# Patient Record
Sex: MALE | Race: WHITE | NOT HISPANIC OR LATINO | Employment: OTHER | URBAN - METROPOLITAN AREA
[De-identification: names, ages, dates, MRNs, and addresses within clinical notes are randomized per-mention and may not be internally consistent; named-entity substitution may affect disease eponyms.]

---

## 2020-06-05 ENCOUNTER — HOSPITAL ENCOUNTER (EMERGENCY)
Facility: HOSPITAL | Age: 30
Discharge: HOME | End: 2020-06-05
Attending: EMERGENCY MEDICINE | Admitting: EMERGENCY MEDICINE
Payer: COMMERCIAL

## 2020-06-05 VITALS
HEART RATE: 121 BPM | TEMPERATURE: 98.9 F | BODY MASS INDEX: 25.05 KG/M2 | DIASTOLIC BLOOD PRESSURE: 74 MMHG | SYSTOLIC BLOOD PRESSURE: 179 MMHG | RESPIRATION RATE: 20 BRPM | WEIGHT: 175 LBS | OXYGEN SATURATION: 99 % | HEIGHT: 70 IN

## 2020-06-05 DIAGNOSIS — L02.91 ABSCESS: ICD-10-CM

## 2020-06-05 PROCEDURE — 99283 EMERGENCY DEPT VISIT LOW MDM: CPT

## 2020-06-05 PROCEDURE — 93005 ELECTROCARDIOGRAM TRACING: CPT

## 2020-06-05 PROCEDURE — 99284 EMERGENCY DEPT VISIT MOD MDM: CPT | Performed by: EMERGENCY MEDICINE

## 2020-06-05 PROCEDURE — 10060 I&D ABSCESS SIMPLE/SINGLE: CPT | Performed by: EMERGENCY MEDICINE

## 2020-06-05 RX ORDER — SULFAMETHOXAZOLE AND TRIMETHOPRIM 800; 160 MG/1; MG/1
1 TABLET ORAL 2 TIMES DAILY
Qty: 14 TABLET | Refills: 0 | Status: SHIPPED | OUTPATIENT
Start: 2020-06-05 | End: 2020-06-12

## 2020-06-05 RX ORDER — LIDOCAINE HYDROCHLORIDE 10 MG/ML
5 INJECTION, SOLUTION EPIDURAL; INFILTRATION; INTRACAUDAL; PERINEURAL ONCE
Status: COMPLETED | OUTPATIENT
Start: 2020-06-05 | End: 2020-06-05

## 2020-06-05 RX ORDER — SULFAMETHOXAZOLE AND TRIMETHOPRIM 800; 160 MG/1; MG/1
1 TABLET ORAL ONCE
Status: COMPLETED | OUTPATIENT
Start: 2020-06-05 | End: 2020-06-05

## 2020-06-05 RX ADMIN — SULFAMETHOXAZOLE AND TRIMETHOPRIM 1 TABLET: 800; 160 TABLET ORAL at 21:27

## 2020-06-05 RX ADMIN — LIDOCAINE HYDROCHLORIDE 5 ML: 10 INJECTION, SOLUTION EPIDURAL; INFILTRATION; INTRACAUDAL; PERINEURAL at 20:52

## 2020-06-06 LAB
ATRIAL RATE: 94 BPM
P AXIS: 44 DEGREES
PR INTERVAL: 224 MS
QRS AXIS: 134 DEGREES
QRSD INTERVAL: 98 MS
QT INTERVAL: 322 MS
QTC INTERVAL: 402 MS
T WAVE AXIS: -27 DEGREES
VENTRICULAR RATE: 94 BPM

## 2020-06-06 PROCEDURE — 93010 ELECTROCARDIOGRAM REPORT: CPT | Performed by: INTERNAL MEDICINE

## 2020-10-30 ENCOUNTER — HOSPITAL ENCOUNTER (EMERGENCY)
Facility: HOSPITAL | Age: 30
Discharge: HOME | End: 2020-10-30
Attending: EMERGENCY MEDICINE | Admitting: EMERGENCY MEDICINE
Payer: COMMERCIAL

## 2020-10-30 ENCOUNTER — APPOINTMENT (EMERGENCY)
Dept: CT IMAGING | Facility: HOSPITAL | Age: 30
End: 2020-10-30
Payer: COMMERCIAL

## 2020-10-30 VITALS
SYSTOLIC BLOOD PRESSURE: 132 MMHG | BODY MASS INDEX: 23.82 KG/M2 | WEIGHT: 166.01 LBS | DIASTOLIC BLOOD PRESSURE: 75 MMHG | OXYGEN SATURATION: 98 % | TEMPERATURE: 98.2 F | RESPIRATION RATE: 16 BRPM | HEART RATE: 88 BPM

## 2020-10-30 VITALS
HEART RATE: 62 BPM | BODY MASS INDEX: 23.82 KG/M2 | SYSTOLIC BLOOD PRESSURE: 127 MMHG | TEMPERATURE: 99 F | OXYGEN SATURATION: 94 % | DIASTOLIC BLOOD PRESSURE: 60 MMHG | WEIGHT: 166.01 LBS | RESPIRATION RATE: 18 BRPM

## 2020-10-30 DIAGNOSIS — K29.70 GASTRITIS: Primary | ICD-10-CM

## 2020-10-30 DIAGNOSIS — K29.00 ACUTE GASTRITIS WITHOUT HEMORRHAGE, UNSPECIFIED GASTRITIS TYPE: ICD-10-CM

## 2020-10-30 DIAGNOSIS — K26.9 DUODENAL ULCER: Primary | ICD-10-CM

## 2020-10-30 DIAGNOSIS — Z87.19 HISTORY OF DUODENAL ULCER: ICD-10-CM

## 2020-10-30 DIAGNOSIS — R10.13 ABDOMINAL PAIN, ACUTE, EPIGASTRIC: ICD-10-CM

## 2020-10-30 LAB
ALBUMIN SERPL BCP-MCNC: 4.8 G/DL (ref 3.5–5)
ALP SERPL-CCNC: 67 U/L (ref 46–116)
ALT SERPL W P-5'-P-CCNC: 39 U/L (ref 12–78)
ANION GAP SERPL CALCULATED.3IONS-SCNC: 14 MMOL/L (ref 4–13)
AST SERPL W P-5'-P-CCNC: 18 U/L (ref 5–45)
BACTERIA UR QL AUTO: NORMAL /HPF
BASOPHILS # BLD AUTO: 0.09 THOUSANDS/ΜL (ref 0–0.1)
BASOPHILS NFR BLD AUTO: 1 % (ref 0–1)
BILIRUB SERPL-MCNC: 0.82 MG/DL (ref 0.2–1)
BILIRUB UR QL STRIP: NEGATIVE
BUN SERPL-MCNC: 22 MG/DL (ref 5–25)
CALCIUM SERPL-MCNC: 9.9 MG/DL (ref 8.3–10.1)
CHLORIDE SERPL-SCNC: 102 MMOL/L (ref 100–108)
CLARITY UR: CLEAR
CO2 SERPL-SCNC: 23 MMOL/L (ref 21–32)
COLOR UR: YELLOW
CREAT SERPL-MCNC: 1.15 MG/DL (ref 0.6–1.3)
EOSINOPHIL # BLD AUTO: 0 THOUSAND/ΜL (ref 0–0.61)
EOSINOPHIL NFR BLD AUTO: 0 % (ref 0–6)
ERYTHROCYTE [DISTWIDTH] IN BLOOD BY AUTOMATED COUNT: 12.9 % (ref 11.6–15.1)
GFR SERPL CREATININE-BSD FRML MDRD: 86 ML/MIN/1.73SQ M
GLUCOSE SERPL-MCNC: 108 MG/DL (ref 65–140)
GLUCOSE UR STRIP-MCNC: NEGATIVE MG/DL
HCT VFR BLD AUTO: 45.6 % (ref 36.5–49.3)
HGB BLD-MCNC: 15.5 G/DL (ref 12–17)
HGB UR QL STRIP.AUTO: ABNORMAL
IMM GRANULOCYTES # BLD AUTO: 0.08 THOUSAND/UL (ref 0–0.2)
IMM GRANULOCYTES NFR BLD AUTO: 0 % (ref 0–2)
KETONES UR STRIP-MCNC: ABNORMAL MG/DL
LEUKOCYTE ESTERASE UR QL STRIP: NEGATIVE
LIPASE SERPL-CCNC: 52 U/L (ref 73–393)
LYMPHOCYTES # BLD AUTO: 2.2 THOUSANDS/ΜL (ref 0.6–4.47)
LYMPHOCYTES NFR BLD AUTO: 11 % (ref 14–44)
MCH RBC QN AUTO: 28.2 PG (ref 26.8–34.3)
MCHC RBC AUTO-ENTMCNC: 34 G/DL (ref 31.4–37.4)
MCV RBC AUTO: 83 FL (ref 82–98)
MONOCYTES # BLD AUTO: 1.84 THOUSAND/ΜL (ref 0.17–1.22)
MONOCYTES NFR BLD AUTO: 10 % (ref 4–12)
NEUTROPHILS # BLD AUTO: 15.23 THOUSANDS/ΜL (ref 1.85–7.62)
NEUTS SEG NFR BLD AUTO: 78 % (ref 43–75)
NITRITE UR QL STRIP: NEGATIVE
NON-SQ EPI CELLS URNS QL MICRO: NORMAL /HPF
NRBC BLD AUTO-RTO: 0 /100 WBCS
PH UR STRIP.AUTO: 5.5 [PH] (ref 4.5–8)
PLATELET # BLD AUTO: 221 THOUSANDS/UL (ref 149–390)
PMV BLD AUTO: 11.9 FL (ref 8.9–12.7)
POTASSIUM SERPL-SCNC: 3.8 MMOL/L (ref 3.5–5.3)
PROT SERPL-MCNC: 8.5 G/DL (ref 6.4–8.2)
PROT UR STRIP-MCNC: ABNORMAL MG/DL
RBC # BLD AUTO: 5.5 MILLION/UL (ref 3.88–5.62)
RBC #/AREA URNS AUTO: NORMAL /HPF
SODIUM SERPL-SCNC: 139 MMOL/L (ref 136–145)
SP GR UR STRIP.AUTO: <=1.005 (ref 1–1.03)
UROBILINOGEN UR QL STRIP.AUTO: 0.2 E.U./DL
WBC # BLD AUTO: 19.44 THOUSAND/UL (ref 4.31–10.16)
WBC #/AREA URNS AUTO: NORMAL /HPF

## 2020-10-30 PROCEDURE — C9113 INJ PANTOPRAZOLE SODIUM, VIA: HCPCS | Performed by: EMERGENCY MEDICINE

## 2020-10-30 PROCEDURE — 96374 THER/PROPH/DIAG INJ IV PUSH: CPT

## 2020-10-30 PROCEDURE — 74177 CT ABD & PELVIS W/CONTRAST: CPT

## 2020-10-30 PROCEDURE — 85025 COMPLETE CBC W/AUTO DIFF WBC: CPT | Performed by: EMERGENCY MEDICINE

## 2020-10-30 PROCEDURE — 83690 ASSAY OF LIPASE: CPT | Performed by: EMERGENCY MEDICINE

## 2020-10-30 PROCEDURE — 81001 URINALYSIS AUTO W/SCOPE: CPT

## 2020-10-30 PROCEDURE — 99285 EMERGENCY DEPT VISIT HI MDM: CPT | Performed by: EMERGENCY MEDICINE

## 2020-10-30 PROCEDURE — 96361 HYDRATE IV INFUSION ADD-ON: CPT

## 2020-10-30 PROCEDURE — 99283 EMERGENCY DEPT VISIT LOW MDM: CPT

## 2020-10-30 PROCEDURE — 36415 COLL VENOUS BLD VENIPUNCTURE: CPT | Performed by: EMERGENCY MEDICINE

## 2020-10-30 PROCEDURE — 80053 COMPREHEN METABOLIC PANEL: CPT | Performed by: EMERGENCY MEDICINE

## 2020-10-30 PROCEDURE — 99284 EMERGENCY DEPT VISIT MOD MDM: CPT

## 2020-10-30 PROCEDURE — G1004 CDSM NDSC: HCPCS

## 2020-10-30 PROCEDURE — 96375 TX/PRO/DX INJ NEW DRUG ADDON: CPT

## 2020-10-30 PROCEDURE — 99284 EMERGENCY DEPT VISIT MOD MDM: CPT | Performed by: EMERGENCY MEDICINE

## 2020-10-30 RX ORDER — SUCRALFATE 1 G/1
1 TABLET ORAL ONCE
Status: COMPLETED | OUTPATIENT
Start: 2020-10-30 | End: 2020-10-30

## 2020-10-30 RX ORDER — ONDANSETRON 2 MG/ML
4 INJECTION INTRAMUSCULAR; INTRAVENOUS ONCE
Status: COMPLETED | OUTPATIENT
Start: 2020-10-30 | End: 2020-10-30

## 2020-10-30 RX ORDER — SUCRALFATE ORAL 1 G/10ML
1 SUSPENSION ORAL
Qty: 1200 ML | Refills: 0 | Status: SHIPPED | OUTPATIENT
Start: 2020-10-30 | End: 2021-08-30 | Stop reason: ALTCHOICE

## 2020-10-30 RX ORDER — LIDOCAINE HYDROCHLORIDE 20 MG/ML
15 SOLUTION OROPHARYNGEAL ONCE
Status: COMPLETED | OUTPATIENT
Start: 2020-10-30 | End: 2020-10-30

## 2020-10-30 RX ORDER — ONDANSETRON 4 MG/1
4 TABLET, ORALLY DISINTEGRATING ORAL EVERY 6 HOURS PRN
Qty: 12 TABLET | Refills: 0 | Status: SHIPPED | OUTPATIENT
Start: 2020-10-30 | End: 2021-03-08 | Stop reason: ALTCHOICE

## 2020-10-30 RX ORDER — PANTOPRAZOLE SODIUM 40 MG/1
40 INJECTION, POWDER, FOR SOLUTION INTRAVENOUS ONCE
Status: COMPLETED | OUTPATIENT
Start: 2020-10-30 | End: 2020-10-30

## 2020-10-30 RX ORDER — PANTOPRAZOLE SODIUM 40 MG/1
40 TABLET, DELAYED RELEASE ORAL DAILY
Qty: 30 TABLET | Refills: 0 | Status: SHIPPED | OUTPATIENT
Start: 2020-10-30 | End: 2020-11-02 | Stop reason: HOSPADM

## 2020-10-30 RX ORDER — MAGNESIUM HYDROXIDE/ALUMINUM HYDROXICE/SIMETHICONE 120; 1200; 1200 MG/30ML; MG/30ML; MG/30ML
30 SUSPENSION ORAL ONCE
Status: COMPLETED | OUTPATIENT
Start: 2020-10-30 | End: 2020-10-30

## 2020-10-30 RX ADMIN — ALUMINUM HYDROXIDE, MAGNESIUM HYDROXIDE, AND SIMETHICONE 30 ML: 200; 200; 20 SUSPENSION ORAL at 09:20

## 2020-10-30 RX ADMIN — SUCRALFATE 1 G: 1 TABLET ORAL at 09:44

## 2020-10-30 RX ADMIN — ONDANSETRON 4 MG: 2 INJECTION INTRAMUSCULAR; INTRAVENOUS at 18:52

## 2020-10-30 RX ADMIN — SODIUM CHLORIDE 1000 ML: 0.9 INJECTION, SOLUTION INTRAVENOUS at 18:52

## 2020-10-30 RX ADMIN — PANTOPRAZOLE SODIUM 40 MG: 40 INJECTION, POWDER, FOR SOLUTION INTRAVENOUS at 18:52

## 2020-10-30 RX ADMIN — IOHEXOL 100 ML: 350 INJECTION, SOLUTION INTRAVENOUS at 19:10

## 2020-10-30 RX ADMIN — LIDOCAINE HYDROCHLORIDE 15 ML: 20 SOLUTION ORAL; TOPICAL at 09:20

## 2020-11-01 ENCOUNTER — APPOINTMENT (EMERGENCY)
Dept: RADIOLOGY | Facility: HOSPITAL | Age: 30
End: 2020-11-01
Payer: COMMERCIAL

## 2020-11-01 ENCOUNTER — HOSPITAL ENCOUNTER (OUTPATIENT)
Facility: HOSPITAL | Age: 30
Setting detail: OBSERVATION
Discharge: HOME | End: 2020-11-02
Attending: EMERGENCY MEDICINE | Admitting: INTERNAL MEDICINE
Payer: COMMERCIAL

## 2020-11-01 ENCOUNTER — APPOINTMENT (EMERGENCY)
Dept: CT IMAGING | Facility: HOSPITAL | Age: 30
End: 2020-11-01
Payer: COMMERCIAL

## 2020-11-01 DIAGNOSIS — K29.00 ACUTE GASTRITIS WITHOUT HEMORRHAGE, UNSPECIFIED GASTRITIS TYPE: ICD-10-CM

## 2020-11-01 DIAGNOSIS — K26.9 DUODENAL ULCER: ICD-10-CM

## 2020-11-01 DIAGNOSIS — R10.13 EPIGASTRIC PAIN: Primary | ICD-10-CM

## 2020-11-01 DIAGNOSIS — R11.2 NAUSEA AND VOMITING: ICD-10-CM

## 2020-11-01 PROBLEM — R17 ELEVATED BILIRUBIN: Status: ACTIVE | Noted: 2020-11-01

## 2020-11-01 PROBLEM — E87.6 HYPOKALEMIA: Status: ACTIVE | Noted: 2020-11-01

## 2020-11-01 PROBLEM — D72.829 LEUKOCYTOSIS: Status: ACTIVE | Noted: 2020-11-01

## 2020-11-01 PROBLEM — Z72.0 TOBACCO USE: Status: ACTIVE | Noted: 2020-11-01

## 2020-11-01 LAB
ALBUMIN SERPL BCP-MCNC: 4.3 G/DL (ref 3.5–5)
ALP SERPL-CCNC: 65 U/L (ref 46–116)
ALT SERPL W P-5'-P-CCNC: 34 U/L (ref 12–78)
ANION GAP SERPL CALCULATED.3IONS-SCNC: 11 MMOL/L (ref 4–13)
APTT PPP: 27 SECONDS (ref 23–37)
AST SERPL W P-5'-P-CCNC: 21 U/L (ref 5–45)
ATRIAL RATE: 62 BPM
ATRIAL RATE: 64 BPM
BASOPHILS # BLD AUTO: 0.12 THOUSANDS/ΜL (ref 0–0.1)
BASOPHILS NFR BLD AUTO: 1 % (ref 0–1)
BILIRUB DIRECT SERPL-MCNC: 0.24 MG/DL (ref 0–0.2)
BILIRUB SERPL-MCNC: 1.23 MG/DL (ref 0.2–1)
BUN SERPL-MCNC: 16 MG/DL (ref 5–25)
CALCIUM SERPL-MCNC: 9.2 MG/DL (ref 8.3–10.1)
CHLORIDE SERPL-SCNC: 99 MMOL/L (ref 100–108)
CO2 SERPL-SCNC: 25 MMOL/L (ref 21–32)
CREAT SERPL-MCNC: 1.01 MG/DL (ref 0.6–1.3)
EOSINOPHIL # BLD AUTO: 0.02 THOUSAND/ΜL (ref 0–0.61)
EOSINOPHIL NFR BLD AUTO: 0 % (ref 0–6)
ERYTHROCYTE [DISTWIDTH] IN BLOOD BY AUTOMATED COUNT: 12.6 % (ref 11.6–15.1)
GFR SERPL CREATININE-BSD FRML MDRD: 100 ML/MIN/1.73SQ M
GLUCOSE SERPL-MCNC: 103 MG/DL (ref 65–140)
HCT VFR BLD AUTO: 42.6 % (ref 36.5–49.3)
HGB BLD-MCNC: 14.5 G/DL (ref 12–17)
IMM GRANULOCYTES # BLD AUTO: 0.06 THOUSAND/UL (ref 0–0.2)
IMM GRANULOCYTES NFR BLD AUTO: 0 % (ref 0–2)
INR PPP: 1.07 (ref 0.84–1.19)
LACTATE SERPL-SCNC: 1 MMOL/L (ref 0.5–2)
LIPASE SERPL-CCNC: 77 U/L (ref 73–393)
LYMPHOCYTES # BLD AUTO: 2.68 THOUSANDS/ΜL (ref 0.6–4.47)
LYMPHOCYTES NFR BLD AUTO: 17 % (ref 14–44)
MAGNESIUM SERPL-MCNC: 2.3 MG/DL (ref 1.6–2.6)
MCH RBC QN AUTO: 28.4 PG (ref 26.8–34.3)
MCHC RBC AUTO-ENTMCNC: 34 G/DL (ref 31.4–37.4)
MCV RBC AUTO: 83 FL (ref 82–98)
MONOCYTES # BLD AUTO: 1.47 THOUSAND/ΜL (ref 0.17–1.22)
MONOCYTES NFR BLD AUTO: 9 % (ref 4–12)
NEUTROPHILS # BLD AUTO: 11.49 THOUSANDS/ΜL (ref 1.85–7.62)
NEUTS SEG NFR BLD AUTO: 73 % (ref 43–75)
NRBC BLD AUTO-RTO: 0 /100 WBCS
P AXIS: 36 DEGREES
P AXIS: 54 DEGREES
PLATELET # BLD AUTO: 193 THOUSANDS/UL (ref 149–390)
PMV BLD AUTO: 12 FL (ref 8.9–12.7)
POTASSIUM SERPL-SCNC: 3.3 MMOL/L (ref 3.5–5.3)
PR INTERVAL: 168 MS
PROT SERPL-MCNC: 7.6 G/DL (ref 6.4–8.2)
PROTHROMBIN TIME: 14.1 SECONDS (ref 11.6–14.5)
QRS AXIS: 85 DEGREES
QRS AXIS: 90 DEGREES
QRSD INTERVAL: 102 MS
QRSD INTERVAL: 94 MS
QT INTERVAL: 404 MS
QT INTERVAL: 412 MS
QTC INTERVAL: 418 MS
QTC INTERVAL: 432 MS
RBC # BLD AUTO: 5.11 MILLION/UL (ref 3.88–5.62)
SARS-COV-2 RNA RESP QL NAA+PROBE: NEGATIVE
SODIUM SERPL-SCNC: 135 MMOL/L (ref 136–145)
T WAVE AXIS: 53 DEGREES
T WAVE AXIS: 61 DEGREES
VENTRICULAR RATE: 62 BPM
VENTRICULAR RATE: 69 BPM
WBC # BLD AUTO: 15.84 THOUSAND/UL (ref 4.31–10.16)

## 2020-11-01 PROCEDURE — 74177 CT ABD & PELVIS W/CONTRAST: CPT

## 2020-11-01 PROCEDURE — 96361 HYDRATE IV INFUSION ADD-ON: CPT

## 2020-11-01 PROCEDURE — 93010 ELECTROCARDIOGRAM REPORT: CPT | Performed by: INTERNAL MEDICINE

## 2020-11-01 PROCEDURE — 93005 ELECTROCARDIOGRAM TRACING: CPT

## 2020-11-01 PROCEDURE — 85610 PROTHROMBIN TIME: CPT | Performed by: EMERGENCY MEDICINE

## 2020-11-01 PROCEDURE — G1004 CDSM NDSC: HCPCS

## 2020-11-01 PROCEDURE — 99285 EMERGENCY DEPT VISIT HI MDM: CPT

## 2020-11-01 PROCEDURE — C9113 INJ PANTOPRAZOLE SODIUM, VIA: HCPCS | Performed by: EMERGENCY MEDICINE

## 2020-11-01 PROCEDURE — 99220 PR INITIAL OBSERVATION CARE/DAY 70 MINUTES: CPT | Performed by: INTERNAL MEDICINE

## 2020-11-01 PROCEDURE — 99244 OFF/OP CNSLTJ NEW/EST MOD 40: CPT | Performed by: INTERNAL MEDICINE

## 2020-11-01 PROCEDURE — 83605 ASSAY OF LACTIC ACID: CPT | Performed by: EMERGENCY MEDICINE

## 2020-11-01 PROCEDURE — 83690 ASSAY OF LIPASE: CPT | Performed by: EMERGENCY MEDICINE

## 2020-11-01 PROCEDURE — 99285 EMERGENCY DEPT VISIT HI MDM: CPT | Performed by: EMERGENCY MEDICINE

## 2020-11-01 PROCEDURE — 80053 COMPREHEN METABOLIC PANEL: CPT | Performed by: EMERGENCY MEDICINE

## 2020-11-01 PROCEDURE — 83735 ASSAY OF MAGNESIUM: CPT | Performed by: NURSE PRACTITIONER

## 2020-11-01 PROCEDURE — 87040 BLOOD CULTURE FOR BACTERIA: CPT | Performed by: EMERGENCY MEDICINE

## 2020-11-01 PROCEDURE — 87635 SARS-COV-2 COVID-19 AMP PRB: CPT | Performed by: EMERGENCY MEDICINE

## 2020-11-01 PROCEDURE — 36415 COLL VENOUS BLD VENIPUNCTURE: CPT | Performed by: EMERGENCY MEDICINE

## 2020-11-01 PROCEDURE — 96375 TX/PRO/DX INJ NEW DRUG ADDON: CPT

## 2020-11-01 PROCEDURE — 71045 X-RAY EXAM CHEST 1 VIEW: CPT

## 2020-11-01 PROCEDURE — 85025 COMPLETE CBC W/AUTO DIFF WBC: CPT | Performed by: EMERGENCY MEDICINE

## 2020-11-01 PROCEDURE — 85730 THROMBOPLASTIN TIME PARTIAL: CPT | Performed by: EMERGENCY MEDICINE

## 2020-11-01 PROCEDURE — 82248 BILIRUBIN DIRECT: CPT | Performed by: NURSE PRACTITIONER

## 2020-11-01 PROCEDURE — 96365 THER/PROPH/DIAG IV INF INIT: CPT

## 2020-11-01 RX ORDER — OXYCODONE HYDROCHLORIDE 5 MG/1
5 TABLET ORAL EVERY 4 HOURS PRN
Status: DISCONTINUED | OUTPATIENT
Start: 2020-11-01 | End: 2020-11-02 | Stop reason: HOSPADM

## 2020-11-01 RX ORDER — HYDROMORPHONE HCL/PF 1 MG/ML
0.5 SYRINGE (ML) INJECTION EVERY 4 HOURS PRN
Status: DISCONTINUED | OUTPATIENT
Start: 2020-11-01 | End: 2020-11-02 | Stop reason: HOSPADM

## 2020-11-01 RX ORDER — OXYCODONE HYDROCHLORIDE 10 MG/1
10 TABLET ORAL EVERY 4 HOURS PRN
Status: DISCONTINUED | OUTPATIENT
Start: 2020-11-01 | End: 2020-11-02 | Stop reason: HOSPADM

## 2020-11-01 RX ORDER — MAGNESIUM HYDROXIDE/ALUMINUM HYDROXICE/SIMETHICONE 120; 1200; 1200 MG/30ML; MG/30ML; MG/30ML
30 SUSPENSION ORAL EVERY 6 HOURS PRN
Status: DISCONTINUED | OUTPATIENT
Start: 2020-11-01 | End: 2020-11-02 | Stop reason: HOSPADM

## 2020-11-01 RX ORDER — PANTOPRAZOLE SODIUM 40 MG/1
40 INJECTION, POWDER, FOR SOLUTION INTRAVENOUS ONCE
Status: COMPLETED | OUTPATIENT
Start: 2020-11-01 | End: 2020-11-01

## 2020-11-01 RX ORDER — ACETAMINOPHEN 325 MG/1
650 TABLET ORAL EVERY 6 HOURS PRN
Status: DISCONTINUED | OUTPATIENT
Start: 2020-11-01 | End: 2020-11-02 | Stop reason: HOSPADM

## 2020-11-01 RX ORDER — SODIUM CHLORIDE 9 MG/ML
125 INJECTION, SOLUTION INTRAVENOUS CONTINUOUS
Status: DISCONTINUED | OUTPATIENT
Start: 2020-11-01 | End: 2020-11-02 | Stop reason: HOSPADM

## 2020-11-01 RX ORDER — ONDANSETRON 2 MG/ML
4 INJECTION INTRAMUSCULAR; INTRAVENOUS EVERY 6 HOURS PRN
Status: DISCONTINUED | OUTPATIENT
Start: 2020-11-01 | End: 2020-11-02 | Stop reason: HOSPADM

## 2020-11-01 RX ORDER — ONDANSETRON 2 MG/ML
4 INJECTION INTRAMUSCULAR; INTRAVENOUS ONCE
Status: DISCONTINUED | OUTPATIENT
Start: 2020-11-01 | End: 2020-11-01

## 2020-11-01 RX ORDER — POTASSIUM CHLORIDE 14.9 MG/ML
20 INJECTION INTRAVENOUS ONCE
Status: COMPLETED | OUTPATIENT
Start: 2020-11-01 | End: 2020-11-01

## 2020-11-01 RX ORDER — HYDROMORPHONE HCL/PF 1 MG/ML
0.5 SYRINGE (ML) INJECTION ONCE
Status: COMPLETED | OUTPATIENT
Start: 2020-11-01 | End: 2020-11-01

## 2020-11-01 RX ORDER — METOCLOPRAMIDE HYDROCHLORIDE 5 MG/ML
10 INJECTION INTRAMUSCULAR; INTRAVENOUS ONCE
Status: COMPLETED | OUTPATIENT
Start: 2020-11-01 | End: 2020-11-01

## 2020-11-01 RX ORDER — PANTOPRAZOLE SODIUM 40 MG/1
40 INJECTION, POWDER, FOR SOLUTION INTRAVENOUS EVERY 12 HOURS SCHEDULED
Status: DISCONTINUED | OUTPATIENT
Start: 2020-11-02 | End: 2020-11-02 | Stop reason: HOSPADM

## 2020-11-01 RX ADMIN — SODIUM CHLORIDE 125 ML/HR: 0.9 INJECTION, SOLUTION INTRAVENOUS at 12:23

## 2020-11-01 RX ADMIN — OXYCODONE HYDROCHLORIDE 10 MG: 10 TABLET ORAL at 10:21

## 2020-11-01 RX ADMIN — ONDANSETRON 4 MG: 2 INJECTION INTRAMUSCULAR; INTRAVENOUS at 11:58

## 2020-11-01 RX ADMIN — OXYCODONE HYDROCHLORIDE 10 MG: 10 TABLET ORAL at 18:35

## 2020-11-01 RX ADMIN — PANTOPRAZOLE SODIUM 40 MG: 40 INJECTION, POWDER, FOR SOLUTION INTRAVENOUS at 02:31

## 2020-11-01 RX ADMIN — ONDANSETRON 4 MG: 2 INJECTION INTRAMUSCULAR; INTRAVENOUS at 18:19

## 2020-11-01 RX ADMIN — SODIUM CHLORIDE 125 ML/HR: 0.9 INJECTION, SOLUTION INTRAVENOUS at 04:44

## 2020-11-01 RX ADMIN — IOHEXOL 100 ML: 350 INJECTION, SOLUTION INTRAVENOUS at 04:05

## 2020-11-01 RX ADMIN — ONDANSETRON 4 MG: 2 INJECTION INTRAMUSCULAR; INTRAVENOUS at 06:04

## 2020-11-01 RX ADMIN — OXYCODONE HYDROCHLORIDE 10 MG: 10 TABLET ORAL at 06:07

## 2020-11-01 RX ADMIN — FAMOTIDINE 20 MG: 10 INJECTION, SOLUTION INTRAVENOUS at 02:32

## 2020-11-01 RX ADMIN — POTASSIUM CHLORIDE 20 MEQ: 14.9 INJECTION, SOLUTION INTRAVENOUS at 03:25

## 2020-11-01 RX ADMIN — HYDROMORPHONE HYDROCHLORIDE 0.5 MG: 1 INJECTION, SOLUTION INTRAMUSCULAR; INTRAVENOUS; SUBCUTANEOUS at 02:32

## 2020-11-01 RX ADMIN — SODIUM CHLORIDE 1000 ML: 0.9 INJECTION, SOLUTION INTRAVENOUS at 02:31

## 2020-11-01 RX ADMIN — METOCLOPRAMIDE HYDROCHLORIDE 10 MG: 5 INJECTION INTRAMUSCULAR; INTRAVENOUS at 02:32

## 2020-11-02 VITALS
BODY MASS INDEX: 24.05 KG/M2 | HEIGHT: 70 IN | WEIGHT: 167.99 LBS | HEART RATE: 50 BPM | OXYGEN SATURATION: 97 % | TEMPERATURE: 98.3 F | SYSTOLIC BLOOD PRESSURE: 118 MMHG | DIASTOLIC BLOOD PRESSURE: 71 MMHG | RESPIRATION RATE: 18 BRPM

## 2020-11-02 LAB
ANION GAP SERPL CALCULATED.3IONS-SCNC: 11 MMOL/L (ref 4–13)
BUN SERPL-MCNC: 9 MG/DL (ref 5–25)
CALCIUM SERPL-MCNC: 8.2 MG/DL (ref 8.3–10.1)
CHLORIDE SERPL-SCNC: 104 MMOL/L (ref 100–108)
CO2 SERPL-SCNC: 24 MMOL/L (ref 21–32)
CREAT SERPL-MCNC: 0.87 MG/DL (ref 0.6–1.3)
ERYTHROCYTE [DISTWIDTH] IN BLOOD BY AUTOMATED COUNT: 12.4 % (ref 11.6–15.1)
GFR SERPL CREATININE-BSD FRML MDRD: 117 ML/MIN/1.73SQ M
GLUCOSE SERPL-MCNC: 106 MG/DL (ref 65–140)
HCT VFR BLD AUTO: 36.5 % (ref 36.5–49.3)
HGB BLD-MCNC: 12 G/DL (ref 12–17)
MCH RBC QN AUTO: 27.7 PG (ref 26.8–34.3)
MCHC RBC AUTO-ENTMCNC: 32.9 G/DL (ref 31.4–37.4)
MCV RBC AUTO: 84 FL (ref 82–98)
PLATELET # BLD AUTO: 159 THOUSANDS/UL (ref 149–390)
PMV BLD AUTO: 12.4 FL (ref 8.9–12.7)
POTASSIUM SERPL-SCNC: 3.4 MMOL/L (ref 3.5–5.3)
RBC # BLD AUTO: 4.33 MILLION/UL (ref 3.88–5.62)
SODIUM SERPL-SCNC: 139 MMOL/L (ref 136–145)
WBC # BLD AUTO: 12.53 THOUSAND/UL (ref 4.31–10.16)

## 2020-11-02 PROCEDURE — 80048 BASIC METABOLIC PNL TOTAL CA: CPT | Performed by: NURSE PRACTITIONER

## 2020-11-02 PROCEDURE — 85027 COMPLETE CBC AUTOMATED: CPT | Performed by: NURSE PRACTITIONER

## 2020-11-02 PROCEDURE — 99214 OFFICE O/P EST MOD 30 MIN: CPT | Performed by: INTERNAL MEDICINE

## 2020-11-02 PROCEDURE — 86677 HELICOBACTER PYLORI ANTIBODY: CPT | Performed by: PHYSICIAN ASSISTANT

## 2020-11-02 RX ORDER — PANTOPRAZOLE SODIUM 40 MG/1
40 TABLET, DELAYED RELEASE ORAL
Qty: 60 TABLET | Refills: 5 | Status: SHIPPED | OUTPATIENT
Start: 2020-11-02 | End: 2021-08-01

## 2020-11-02 RX ORDER — POTASSIUM CHLORIDE 14.9 MG/ML
20 INJECTION INTRAVENOUS ONCE
Status: COMPLETED | OUTPATIENT
Start: 2020-11-02 | End: 2020-11-02

## 2020-11-02 RX ORDER — PANTOPRAZOLE SODIUM 40 MG/1
40 TABLET, DELAYED RELEASE ORAL 2 TIMES DAILY
Qty: 30 TABLET | Refills: 0 | Status: SHIPPED | OUTPATIENT
Start: 2020-11-02 | End: 2020-11-02 | Stop reason: HOSPADM

## 2020-11-02 RX ADMIN — OXYCODONE HYDROCHLORIDE 10 MG: 10 TABLET ORAL at 00:33

## 2020-11-02 RX ADMIN — SODIUM CHLORIDE 125 ML/HR: 0.9 INJECTION, SOLUTION INTRAVENOUS at 09:20

## 2020-11-02 RX ADMIN — ONDANSETRON 4 MG: 2 INJECTION INTRAMUSCULAR; INTRAVENOUS at 00:33

## 2020-11-02 RX ADMIN — ONDANSETRON 4 MG: 2 INJECTION INTRAMUSCULAR; INTRAVENOUS at 07:37

## 2020-11-02 RX ADMIN — POTASSIUM CHLORIDE 20 MEQ: 14.9 INJECTION, SOLUTION INTRAVENOUS at 09:55

## 2020-11-02 RX ADMIN — OXYCODONE HYDROCHLORIDE 10 MG: 10 TABLET ORAL at 07:36

## 2020-11-03 LAB
H PYLORI IGG SER IA-ACNC: 0.25 INDEX VALUE (ref 0–0.79)
H PYLORI IGM SER-ACNC: <9 UNITS (ref 0–8.9)

## 2020-11-06 LAB
BACTERIA BLD CULT: NORMAL
BACTERIA BLD CULT: NORMAL

## 2020-11-12 ENCOUNTER — TELEPHONE (OUTPATIENT)
Dept: GASTROENTEROLOGY | Facility: AMBULARY SURGERY CENTER | Age: 30
End: 2020-11-12

## 2020-11-16 ENCOUNTER — TELEPHONE (OUTPATIENT)
Dept: GASTROENTEROLOGY | Facility: AMBULARY SURGERY CENTER | Age: 30
End: 2020-11-16

## 2021-01-07 ENCOUNTER — NURSE TRIAGE (OUTPATIENT)
Dept: OTHER | Facility: OTHER | Age: 31
End: 2021-01-07

## 2021-01-07 DIAGNOSIS — Z20.822 SUSPECTED COVID-19 VIRUS INFECTION: Primary | ICD-10-CM

## 2021-01-07 NOTE — TELEPHONE ENCOUNTER
Regarding: Covid- symptoms   ----- Message from Mirela Mathew sent at 1/7/2021 12:37 PM EST -----  " I am calling to see if I can get a covid test as I have had flue like symptoms for the past week and yesterday I lost my taste and smell "

## 2021-01-07 NOTE — TELEPHONE ENCOUNTER
Reason for Disposition   [1] COVID-19 infection suspected by caller or triager AND [2] mild symptoms (cough, fever, or others) AND [8] no complications or SOB    Answer Assessment - Initial Assessment Questions  1  COVID-19 DIAGNOSIS: "Who made your Coronavirus (COVID-19) diagnosis?" "Was it confirmed by a positive lab test?" If not diagnosed by a HCP, ask "Are there lots of cases (community spread) where you live?" (See public health department website, if unsure)      widespread  2  COVID-19 EXPOSURE: "Was there any known exposure to COVID before the symptoms began?" [facility] Definition of close contact: within 6 feet (2 meters) for a total of 15 minutes or more over a 24-hour period  denies  3  ONSET: "When did the COVID-19 symptoms start?"       1/5/21  4  WORST SYMPTOM: "What is your worst symptom?" (e g , cough, fever, shortness of breath, muscle aches)      Congestion, cough  5  COUGH: "Do you have a cough?" If so, ask: "How bad is the cough?"        Yes,wet cough  6  FEVER: "Do you have a fever?" If so, ask: "What is your temperature, how was it measured, and when did it start?"      Unsure, no thermometer, denies feverish feeling  7  RESPIRATORY STATUS: "Describe your breathing?" (e g , shortness of breath, wheezing, unable to speak)       normal  8  BETTER-SAME-WORSE: "Are you getting better, staying the same or getting worse compared to yesterday?"  If getting worse, ask, "In what way?"      same  9  HIGH RISK DISEASE: "Do you have any chronic medical problems?" (e g , asthma, heart or lung disease, weak immune system, etc )      denies  11   OTHER SYMPTOMS: "Do you have any other symptoms?"  (e g , chills, fatigue, headache, loss of smell or taste, muscle pain, sore throat)        Chills, sore throat, headache, loss of taste and smell    Protocols used: CORONAVIRUS (COVID-19)  DIAGNOSED OR SUSPECTED-ADULT-OH

## 2021-01-27 ENCOUNTER — TELEPHONE (OUTPATIENT)
Dept: GASTROENTEROLOGY | Facility: AMBULARY SURGERY CENTER | Age: 31
End: 2021-01-27

## 2021-01-27 NOTE — TELEPHONE ENCOUNTER
Patient came to his appointment 20 minutes late and COVID Suspected  When asked about the COVID he stated he never got the test done since he never lost his taste or smell just a sore throat  I let him know that we need to r/s appointment since he was 20 min late  Patient stated he it just a follow up no urgent issues   When I went to check the schedule patient was upset and stated he will see someone else

## 2021-02-28 ENCOUNTER — HOSPITAL ENCOUNTER (EMERGENCY)
Facility: HOSPITAL | Age: 31
Discharge: HOME | End: 2021-02-28
Attending: EMERGENCY MEDICINE
Payer: COMMERCIAL

## 2021-02-28 VITALS
HEIGHT: 70 IN | BODY MASS INDEX: 25.77 KG/M2 | SYSTOLIC BLOOD PRESSURE: 139 MMHG | TEMPERATURE: 99 F | RESPIRATION RATE: 20 BRPM | HEART RATE: 57 BPM | WEIGHT: 180 LBS | DIASTOLIC BLOOD PRESSURE: 72 MMHG | OXYGEN SATURATION: 98 %

## 2021-02-28 DIAGNOSIS — R11.2 NAUSEA AND VOMITING: ICD-10-CM

## 2021-02-28 DIAGNOSIS — R10.84 GENERALIZED ABDOMINAL PAIN: Primary | ICD-10-CM

## 2021-02-28 LAB
ALBUMIN SERPL BCP-MCNC: 4.5 G/DL (ref 3.5–5)
ALP SERPL-CCNC: 67 U/L (ref 46–116)
ALT SERPL W P-5'-P-CCNC: 31 U/L (ref 12–78)
AMPHETAMINES SERPL QL SCN: NEGATIVE
ANION GAP SERPL CALCULATED.3IONS-SCNC: 16 MMOL/L (ref 4–13)
AST SERPL W P-5'-P-CCNC: 15 U/L (ref 5–45)
BARBITURATES UR QL: NEGATIVE
BASOPHILS # BLD AUTO: 0.14 THOUSANDS/ΜL (ref 0–0.1)
BASOPHILS NFR BLD AUTO: 1 % (ref 0–1)
BENZODIAZ UR QL: NEGATIVE
BILIRUB SERPL-MCNC: 0.65 MG/DL (ref 0.2–1)
BUN SERPL-MCNC: 14 MG/DL (ref 5–25)
CALCIUM SERPL-MCNC: 9.6 MG/DL (ref 8.3–10.1)
CHLORIDE SERPL-SCNC: 107 MMOL/L (ref 100–108)
CO2 SERPL-SCNC: 20 MMOL/L (ref 21–32)
COCAINE UR QL: NEGATIVE
CREAT SERPL-MCNC: 0.92 MG/DL (ref 0.6–1.3)
EOSINOPHIL # BLD AUTO: 0 THOUSAND/ΜL (ref 0–0.61)
EOSINOPHIL NFR BLD AUTO: 0 % (ref 0–6)
ERYTHROCYTE [DISTWIDTH] IN BLOOD BY AUTOMATED COUNT: 12.7 % (ref 11.6–15.1)
GFR SERPL CREATININE-BSD FRML MDRD: 112 ML/MIN/1.73SQ M
GLUCOSE SERPL-MCNC: 141 MG/DL (ref 65–140)
HCT VFR BLD AUTO: 44.9 % (ref 36.5–49.3)
HGB BLD-MCNC: 15.1 G/DL (ref 12–17)
IMM GRANULOCYTES # BLD AUTO: 0.05 THOUSAND/UL (ref 0–0.2)
IMM GRANULOCYTES NFR BLD AUTO: 0 % (ref 0–2)
LIPASE SERPL-CCNC: 45 U/L (ref 73–393)
LYMPHOCYTES # BLD AUTO: 1.17 THOUSANDS/ΜL (ref 0.6–4.47)
LYMPHOCYTES NFR BLD AUTO: 8 % (ref 14–44)
MCH RBC QN AUTO: 28 PG (ref 26.8–34.3)
MCHC RBC AUTO-ENTMCNC: 33.6 G/DL (ref 31.4–37.4)
MCV RBC AUTO: 83 FL (ref 82–98)
METHADONE UR QL: NEGATIVE
MONOCYTES # BLD AUTO: 0.44 THOUSAND/ΜL (ref 0.17–1.22)
MONOCYTES NFR BLD AUTO: 3 % (ref 4–12)
NEUTROPHILS # BLD AUTO: 12.7 THOUSANDS/ΜL (ref 1.85–7.62)
NEUTS SEG NFR BLD AUTO: 88 % (ref 43–75)
NRBC BLD AUTO-RTO: 0 /100 WBCS
OPIATES UR QL SCN: POSITIVE
OXYCODONE+OXYMORPHONE UR QL SCN: POSITIVE
PCP UR QL: NEGATIVE
PLATELET # BLD AUTO: 201 THOUSANDS/UL (ref 149–390)
PMV BLD AUTO: 11.9 FL (ref 8.9–12.7)
POTASSIUM SERPL-SCNC: 3.9 MMOL/L (ref 3.5–5.3)
PROT SERPL-MCNC: 7.8 G/DL (ref 6.4–8.2)
RBC # BLD AUTO: 5.39 MILLION/UL (ref 3.88–5.62)
SODIUM SERPL-SCNC: 143 MMOL/L (ref 136–145)
THC UR QL: POSITIVE
WBC # BLD AUTO: 14.5 THOUSAND/UL (ref 4.31–10.16)

## 2021-02-28 PROCEDURE — 99284 EMERGENCY DEPT VISIT MOD MDM: CPT

## 2021-02-28 PROCEDURE — 83690 ASSAY OF LIPASE: CPT | Performed by: EMERGENCY MEDICINE

## 2021-02-28 PROCEDURE — 96361 HYDRATE IV INFUSION ADD-ON: CPT

## 2021-02-28 PROCEDURE — 36415 COLL VENOUS BLD VENIPUNCTURE: CPT | Performed by: EMERGENCY MEDICINE

## 2021-02-28 PROCEDURE — 96372 THER/PROPH/DIAG INJ SC/IM: CPT

## 2021-02-28 PROCEDURE — 80053 COMPREHEN METABOLIC PANEL: CPT | Performed by: EMERGENCY MEDICINE

## 2021-02-28 PROCEDURE — 96360 HYDRATION IV INFUSION INIT: CPT

## 2021-02-28 PROCEDURE — 96375 TX/PRO/DX INJ NEW DRUG ADDON: CPT

## 2021-02-28 PROCEDURE — C9113 INJ PANTOPRAZOLE SODIUM, VIA: HCPCS | Performed by: EMERGENCY MEDICINE

## 2021-02-28 PROCEDURE — 85025 COMPLETE CBC W/AUTO DIFF WBC: CPT | Performed by: EMERGENCY MEDICINE

## 2021-02-28 PROCEDURE — 96374 THER/PROPH/DIAG INJ IV PUSH: CPT

## 2021-02-28 PROCEDURE — 99285 EMERGENCY DEPT VISIT HI MDM: CPT | Performed by: EMERGENCY MEDICINE

## 2021-02-28 PROCEDURE — 80307 DRUG TEST PRSMV CHEM ANLYZR: CPT | Performed by: EMERGENCY MEDICINE

## 2021-02-28 RX ORDER — HYDROMORPHONE HCL/PF 1 MG/ML
0.5 SYRINGE (ML) INJECTION ONCE
Status: COMPLETED | OUTPATIENT
Start: 2021-02-28 | End: 2021-02-28

## 2021-02-28 RX ORDER — PANTOPRAZOLE SODIUM 40 MG/1
40 INJECTION, POWDER, FOR SOLUTION INTRAVENOUS ONCE
Status: COMPLETED | OUTPATIENT
Start: 2021-02-28 | End: 2021-02-28

## 2021-02-28 RX ORDER — HALOPERIDOL 5 MG/ML
5 INJECTION INTRAMUSCULAR ONCE
Status: COMPLETED | OUTPATIENT
Start: 2021-02-28 | End: 2021-02-28

## 2021-02-28 RX ORDER — ONDANSETRON 2 MG/ML
4 INJECTION INTRAMUSCULAR; INTRAVENOUS ONCE
Status: COMPLETED | OUTPATIENT
Start: 2021-02-28 | End: 2021-02-28

## 2021-02-28 RX ORDER — MAGNESIUM HYDROXIDE/ALUMINUM HYDROXICE/SIMETHICONE 120; 1200; 1200 MG/30ML; MG/30ML; MG/30ML
30 SUSPENSION ORAL ONCE
Status: COMPLETED | OUTPATIENT
Start: 2021-02-28 | End: 2021-02-28

## 2021-02-28 RX ADMIN — SODIUM CHLORIDE 1000 ML: 0.9 INJECTION, SOLUTION INTRAVENOUS at 19:09

## 2021-02-28 RX ADMIN — HALOPERIDOL LACTATE 5 MG: 5 INJECTION, SOLUTION INTRAMUSCULAR at 17:40

## 2021-02-28 RX ADMIN — HYDROMORPHONE HYDROCHLORIDE 0.5 MG: 1 INJECTION, SOLUTION INTRAMUSCULAR; INTRAVENOUS; SUBCUTANEOUS at 16:29

## 2021-02-28 RX ADMIN — PANTOPRAZOLE SODIUM 40 MG: 40 INJECTION, POWDER, FOR SOLUTION INTRAVENOUS at 16:28

## 2021-02-28 RX ADMIN — SODIUM CHLORIDE 1000 ML: 0.9 INJECTION, SOLUTION INTRAVENOUS at 17:39

## 2021-02-28 RX ADMIN — ALUMINA, MAGNESIA, AND SIMETHICONE ORAL SUSPENSION REGULAR STRENGTH 30 ML: 1200; 1200; 120 SUSPENSION ORAL at 17:40

## 2021-02-28 RX ADMIN — ONDANSETRON 4 MG: 2 INJECTION INTRAMUSCULAR; INTRAVENOUS at 16:29

## 2021-02-28 NOTE — ED PROVIDER NOTES
History  Chief Complaint   Patient presents with    Abdominal Pain     pt presents for abdominal pain that came back this morning, states dx'd with duodenal ulcer     Patient is a 80-year-old male with a history of duodenal ulcer who presents with abdominal pain and nausea  Patient states that he woke up with abdominal discomfort which significantly worsened throughout the morning and afternoon  He describes a generalized abdominal burning  The pain is similar to previous episodes related to his peptic ulcer disease  He states that his last EGD was about 6 months ago which showed a duodenal ulcer  He has been taking Protonix regularly  He was hospitalized in November 2020 with similar pain  He has not seen GI since that hospitalization  After review of medical records, it appears the patient was late for his January appointment and did not reschedule this appointment  Patient admits to nausea today and was unable to tolerate his medications  He denies fever, chills, diarrhea, constipation or other complaints  He denies any alcohol or drug use, including marijuana  History provided by:  Patient  Abdominal Pain  Pain location:  Generalized  Pain quality: burning    Pain radiates to:  Does not radiate  Pain severity:  Severe  Duration:  1 day  Timing:  Constant  Progression:  Worsening  Chronicity:  Recurrent  Ineffective treatments: PPI  Associated symptoms: nausea    Associated symptoms: no chest pain, no chills, no constipation, no cough, no diarrhea, no dysuria, no fever, no hematuria, no shortness of breath, no sore throat and no vomiting        Prior to Admission Medications   Prescriptions Last Dose Informant Patient Reported?  Taking?   ondansetron (ZOFRAN-ODT) 4 mg disintegrating tablet   No No   Sig: Take 1 tablet (4 mg total) by mouth every 6 (six) hours as needed for nausea or vomiting   pantoprazole (PROTONIX) 40 mg tablet 2/27/2021 at Unknown time  No Yes   Sig: Take 1 tablet (40 mg total) by mouth 2 (two) times a day before meals   sucralfate (CARAFATE) 1 g/10 mL suspension   No No   Sig: Take 10 mL (1 g total) by mouth 4 (four) times a day (with meals and at bedtime)      Facility-Administered Medications: None       Past Medical History:   Diagnosis Date    Duodenal ulcer        Past Surgical History:   Procedure Laterality Date    EGD         History reviewed  No pertinent family history  I have reviewed and agree with the history as documented  E-Cigarette/Vaping    E-Cigarette Use Never User      E-Cigarette/Vaping Substances     Social History     Tobacco Use    Smoking status: Former Smoker     Types: Cigarettes    Smokeless tobacco: Never Used   Substance Use Topics    Alcohol use: Not Currently    Drug use: Not Currently       Review of Systems   Constitutional: Negative for chills, diaphoresis and fever  HENT: Negative for nosebleeds, sore throat and trouble swallowing  Eyes: Negative for photophobia, pain and visual disturbance  Respiratory: Negative for cough, chest tightness and shortness of breath  Cardiovascular: Negative for chest pain, palpitations and leg swelling  Gastrointestinal: Positive for abdominal pain and nausea  Negative for constipation, diarrhea and vomiting  Endocrine: Negative for polydipsia and polyuria  Genitourinary: Negative for difficulty urinating, dysuria and hematuria  Musculoskeletal: Negative for back pain, neck pain and neck stiffness  Skin: Negative for pallor and rash  Neurological: Negative for dizziness, syncope, light-headedness and headaches  All other systems reviewed and are negative  Physical Exam  Physical Exam  Vitals signs and nursing note reviewed  Constitutional:       General: He is not in acute distress  Appearance: He is well-developed  HENT:      Head: Normocephalic and atraumatic  Eyes:      Pupils: Pupils are equal, round, and reactive to light     Neck:      Musculoskeletal: Normal range of motion and neck supple  Cardiovascular:      Rate and Rhythm: Normal rate and regular rhythm  Pulses: Normal pulses  Heart sounds: Normal heart sounds  Pulmonary:      Effort: Pulmonary effort is normal  No respiratory distress  Breath sounds: Normal breath sounds  Abdominal:      General: Bowel sounds are normal  There is no distension  Palpations: Abdomen is soft  Abdomen is not rigid  Tenderness: There is generalized abdominal tenderness  There is no guarding or rebound  Musculoskeletal: Normal range of motion  General: No tenderness  Lymphadenopathy:      Cervical: No cervical adenopathy  Skin:     General: Skin is warm and dry  Capillary Refill: Capillary refill takes less than 2 seconds  Neurological:      Mental Status: He is alert and oriented to person, place, and time  Cranial Nerves: No cranial nerve deficit  Sensory: No sensory deficit           Vital Signs  ED Triage Vitals   Temperature Pulse Respirations Blood Pressure SpO2   02/28/21 1553 02/28/21 1550 02/28/21 1550 02/28/21 1554 02/28/21 1550   99 °F (37 2 °C) 61 18 130/59 100 %      Temp Source Heart Rate Source Patient Position - Orthostatic VS BP Location FiO2 (%)   02/28/21 1553 02/28/21 1550 02/28/21 1910 02/28/21 1910 --   Oral Monitor Lying Left arm       Pain Score       02/28/21 1629       Worst Possible Pain           Vitals:    02/28/21 1550 02/28/21 1554 02/28/21 1910   BP:  130/59 139/72   Pulse: 61  57   Patient Position - Orthostatic VS:   Lying         Visual Acuity      ED Medications  Medications   pantoprazole (PROTONIX) injection 40 mg (40 mg Intravenous Given 2/28/21 1628)   ondansetron (ZOFRAN) injection 4 mg (4 mg Intravenous Given 2/28/21 1629)   HYDROmorphone (DILAUDID) injection 0 5 mg (0 5 mg Intravenous Given 2/28/21 1629)   sodium chloride 0 9 % bolus 1,000 mL (0 mL Intravenous Stopped 2/28/21 1843)   haloperidol lactate (HALDOL) injection 5 mg (5 mg Intramuscular Given 2/28/21 1740)   aluminum-magnesium hydroxide-simethicone (MYLANTA) oral suspension 30 mL (30 mL Oral Given 2/28/21 1740)   sodium chloride 0 9 % bolus 1,000 mL (0 mL Intravenous Stopped 2/28/21 2016)       Diagnostic Studies  Results Reviewed     Procedure Component Value Units Date/Time    Rapid drug screen, urine [[id number]]  (Abnormal) Collected: 02/28/21 2000    Lab Status: Final result Specimen: Urine, Clean Catch Updated: 02/28/21 2026     Amph/Meth UR Negative     Barbiturate Ur Negative     Benzodiazepine Urine Negative     Cocaine Urine Negative     Methadone Urine Negative     Opiate Urine Positive     PCP Ur Negative     THC Urine Positive     Oxycodone Urine Positive    Narrative:      Presumptive report  If requested, specimen will be sent to reference lab for confirmation  FOR MEDICAL PURPOSES ONLY  IF CONFIRMATION NEEDED PLEASE CONTACT THE LAB WITHIN 5 DAYS      Drug Screen Cutoff Levels:  AMPHETAMINE/METHAMPHETAMINES  1000 ng/mL  BARBITURATES     200 ng/mL  BENZODIAZEPINES     200 ng/mL  COCAINE      300 ng/mL  METHADONE      300 ng/mL  OPIATES      300 ng/mL  PHENCYCLIDINE     25 ng/mL  THC       50 ng/mL  OXYCODONE      100 ng/mL    Comprehensive metabolic panel [[id number]]  (Abnormal) Collected: 02/28/21 1628    Lab Status: Final result Specimen: Blood from Arm, Right Updated: 02/28/21 1711     Sodium 143 mmol/L      Potassium 3 9 mmol/L      Chloride 107 mmol/L      CO2 20 mmol/L      ANION GAP 16 mmol/L      BUN 14 mg/dL      Creatinine 0 92 mg/dL      Glucose 141 mg/dL      Calcium 9 6 mg/dL      AST 15 U/L      ALT 31 U/L      Alkaline Phosphatase 67 U/L      Total Protein 7 8 g/dL      Albumin 4 5 g/dL      Total Bilirubin 0 65 mg/dL      eGFR 112 ml/min/1 73sq m     Narrative:      [place] guidelines for Chronic Kidney Disease (CKD):     Stage 1 with normal or high GFR (GFR > 90 mL/min/1 73 square meters)    Stage 2 Mild CKD (GFR = 60-89 mL/min/1 73 square meters)    Stage 3A Moderate CKD (GFR = 45-59 mL/min/1 73 square meters)    Stage 3B Moderate CKD (GFR = 30-44 mL/min/1 73 square meters)    Stage 4 Severe CKD (GFR = 15-29 mL/min/1 73 square meters)    Stage 5 End Stage CKD (GFR <15 mL/min/1 73 square meters)  Note: GFR calculation is accurate only with a steady state creatinine    Lipase [[id number]]  (Abnormal) Collected: 02/28/21 1628    Lab Status: Final result Specimen: Blood from Arm, Right Updated: 02/28/21 1711     Lipase 45 u/L     CBC and differential [[id number]]  (Abnormal) Collected: 02/28/21 1628    Lab Status: Final result Specimen: Blood from Arm, Right Updated: 02/28/21 1640     WBC 14 50 Thousand/uL      RBC 5 39 Million/uL      Hemoglobin 15 1 g/dL      Hematocrit 44 9 %      MCV 83 fL      MCH 28 0 pg      MCHC 33 6 g/dL      RDW 12 7 %      MPV 11 9 fL      Platelets 747 Thousands/uL      nRBC 0 /100 WBCs      Neutrophils Relative 88 %      Immat GRANS % 0 %      Lymphocytes Relative 8 %      Monocytes Relative 3 %      Eosinophils Relative 0 %      Basophils Relative 1 %      Neutrophils Absolute 12 70 Thousands/µL      Immature Grans Absolute 0 05 Thousand/uL      Lymphocytes Absolute 1 17 Thousands/µL      Monocytes Absolute 0 44 Thousand/µL      Eosinophils Absolute 0 00 Thousand/µL      Basophils Absolute 0 14 Thousands/µL                  No orders to display              Procedures  Procedures         ED Course  ED Course as of Mar 05 2309   Albino Scale Feb 28, 2021   1607 I discussed obtaining an x-ray to rule out free air, however patient refuses any imaging  He states that multiple imaging studies in the past for similar presentations and they always "normal"  Discussed the risk of perforated peptic ulcer disease, but patient continues to decline imaging  [phone] Patient also refusing EKG  4761 Patient initially had improvement after medications but now has worsening burning in her abdomen    He continues to refuse imaging  1740 Patient receiving additional fluids and IM Haldol  Although patient denies marijuana use, possible cannabinoid hyperemesis syndrome versus cyclic vomiting syndrome verses peptic ulcer disease  1835 Patient sleeping currently  IV fluids completed  Will reassess shortly  1859 Patient states doing a little bit better  Does not wish to p o  Challenge yet  [facility] [place]  Number of Diagnoses or Management Options  Generalized abdominal pain: new and requires workup  Nausea and vomiting: new and requires workup  Diagnosis management comments: Patient presents with abdominal pain, nausea and vomiting  He has a history of peptic ulcer disease  This may be the cause of his symptoms today  However I am also concerned for possible cyclic vomiting syndrome or cannabinoid hyperemesis syndrome  Patient did not initially have significant relief pain after PPI and opioid pain medication  However he did seem to have significant improvement with IM Haldol  Patient is currently receiving IV fluids for volume depletion  Will p o  Challenge  Disposition pending completion of IV fluids and p o  Challenge  I have signed patient out to my colleague, Dr Sinan Munoz, for final disposition         Amount and/or Complexity of Data Reviewed  Clinical lab tests: ordered and reviewed  Tests in the medicine section of CPT®: ordered and reviewed  Review and summarize past medical records: yes  Discuss the patient with other providers: yes  Independent visualization of images, tracings, or specimens: yes    Risk of Complications, Morbidity, and/or Mortality  Presenting problems: high  Diagnostic procedures: moderate  Management options: high    Patient Progress  Patient progress: stable      Disposition  Final diagnoses:   Generalized abdominal pain   Nausea and vomiting     Time reflects when diagnosis was documented in both MDM as applicable and the Disposition within this note     Time User Action Codes Description Comment    2/28/2021  7:01 PM Vitaliy Fairbanks Add [R10 84] Generalized abdominal pain     2/28/2021  7:01 PM Vitaliy Fairbanks Add [R11 2] Nausea and vomiting       ED Disposition     ED Disposition Condition Date/Time Comment    Discharge Stable Sun Feb 28, 2021  9:15 PM Pérez Jules discharge to home/self care  Follow-up Information     Follow up With Specialties Details Why Contact Info Additional [facility] [facility] Schedule an appointment as soon as possible for a visit in 1 week  [facility]  [facility] [address] [facility] [place], [address] [facility] [address] [place], [place], [address] [facility]          Discharge Medication List as of 2/28/2021  9:15 PM      CONTINUE these medications which have NOT CHANGED    Details   ondansetron (ZOFRAN-ODT) 4 mg disintegrating tablet Take 1 tablet (4 mg total) by mouth every 6 (six) hours as needed for nausea or vomiting, Starting Fri 10/30/2020, Normal      pantoprazole (PROTONIX) 40 mg tablet Take 1 tablet (40 mg total) by mouth 2 (two) times a day before meals, Starting Mon 11/2/2020, Normal      sucralfate (CARAFATE) 1 g/10 mL suspension Take 10 mL (1 g total) by mouth 4 (four) times a day (with meals and at bedtime), Starting Fri 10/30/2020, Until Sun 11/29/2020, Normal           No discharge procedures on file      PDMP Review       Value Time User    PDMP Reviewed  Yes 11/1/2020  1:49 AM Angel Izquierdo MD          ED Provider  Electronically Signed by           Abilio Valenzuela DO  03/05/21 6355

## 2021-03-01 NOTE — ED CARE HANDOFF
Emergency Department Sign Out Note        Sign out and transfer of care from Dr Ruthann Stallings  See Separate Emergency Department note  The patient, Nikki Bermudez, was evaluated by the previous provider for nausea and vomiting as well as abdominal pain      Workup Completed:  CBC, CMP  ED Course / Workup Pending (followup):  UDS - PO challenge                 Labs Reviewed   CBC AND DIFFERENTIAL - Abnormal       Result Value Ref Range Status    WBC 14 50 (*) 4 31 - 10 16 Thousand/uL Final    RBC 5 39  3 88 - 5 62 Million/uL Final    Hemoglobin 15 1  12 0 - 17 0 g/dL Final    Hematocrit 44 9  36 5 - 49 3 % Final    MCV 83  82 - 98 fL Final    MCH 28 0  26 8 - 34 3 pg Final    MCHC 33 6  31 4 - 37 4 g/dL Final    RDW 12 7  11 6 - 15 1 % Final    MPV 11 9  8 9 - 12 7 fL Final    Platelets 099  846 - 390 Thousands/uL Final    nRBC 0  /100 WBCs Final    Neutrophils Relative 88 (*) 43 - 75 % Final    Immat GRANS % 0  0 - 2 % Final    Lymphocytes Relative 8 (*) 14 - 44 % Final    Monocytes Relative 3 (*) 4 - 12 % Final    Eosinophils Relative 0  0 - 6 % Final    Basophils Relative 1  0 - 1 % Final    Neutrophils Absolute 12 70 (*) 1 85 - 7 62 Thousands/µL Final    Immature Grans Absolute 0 05  0 00 - 0 20 Thousand/uL Final    Lymphocytes Absolute 1 17  0 60 - 4 47 Thousands/µL Final    Monocytes Absolute 0 44  0 17 - 1 22 Thousand/µL Final    Eosinophils Absolute 0 00  0 00 - 0 61 Thousand/µL Final    Basophils Absolute 0 14 (*) 0 00 - 0 10 Thousands/µL Final   COMPREHENSIVE METABOLIC PANEL - Abnormal    Sodium 143  136 - 145 mmol/L Final    Potassium 3 9  3 5 - 5 3 mmol/L Final    Chloride 107  100 - 108 mmol/L Final    CO2 20 (*) 21 - 32 mmol/L Final    ANION GAP 16 (*) 4 - 13 mmol/L Final    BUN 14  5 - 25 mg/dL Final    Creatinine 0 92  0 60 - 1 30 mg/dL Final    Comment: Standardized to IDMS reference method    Glucose 141 (*) 65 - 140 mg/dL Final    Comment: If the patient is fasting, the ADA then defines impaired fasting glucose as > 100 mg/dL and diabetes as > or equal to 123 mg/dL  Specimen collection should occur prior to Sulfasalazine administration due to the potential for falsely depressed results  Specimen collection should occur prior to Sulfapyridine administration due to the potential for falsely elevated results  Calcium 9 6  8 3 - 10 1 mg/dL Final    AST 15  5 - 45 U/L Final    Comment: Specimen collection should occur prior to Sulfasalazine administration due to the potential for falsely depressed results  ALT 31  12 - 78 U/L Final    Comment: Specimen collection should occur prior to Sulfasalazine administration due to the potential for falsely depressed results  Alkaline Phosphatase 67  46 - 116 U/L Final    Total Protein 7 8  6 4 - 8 2 g/dL Final    Albumin 4 5  3 5 - 5 0 g/dL Final    Total Bilirubin 0 65  0 20 - 1 00 mg/dL Final    Comment: Use of this assay is not recommended for patients undergoing treatment with eltrombopag due to the potential for falsely elevated results      eGFR 112  ml/min/1 73sq m Final    Narrative:     [place] guidelines for Chronic Kidney Disease (CKD):     Stage 1 with normal or high GFR (GFR > 90 mL/min/1 73 square meters)    Stage 2 Mild CKD (GFR = 60-89 mL/min/1 73 square meters)    Stage 3A Moderate CKD (GFR = 45-59 mL/min/1 73 square meters)    Stage 3B Moderate CKD (GFR = 30-44 mL/min/1 73 square meters)    Stage 4 Severe CKD (GFR = 15-29 mL/min/1 73 square meters)    Stage 5 End Stage CKD (GFR <15 mL/min/1 73 square meters)  Note: GFR calculation is accurate only with a steady state creatinine   LIPASE - Abnormal    Lipase 45 (*) 73 - 393 u/L Final   RAPID DRUG SCREEN, URINE - Abnormal    Amph/Meth UR Negative  Negative Final    Barbiturate Ur Negative  Negative Final    Benzodiazepine Urine Negative  Negative Final    Cocaine Urine Negative  Negative Final    Methadone Urine Negative  Negative Final    Opiate Urine Positive (*) Negative Final    PCP Ur Negative  Negative Final    THC Urine Positive (*) Negative Final    Oxycodone Urine Positive (*) Negative Final    Narrative:     Presumptive report  If requested, specimen will be sent to reference lab for confirmation  FOR MEDICAL PURPOSES ONLY  IF CONFIRMATION NEEDED PLEASE CONTACT THE LAB WITHIN 5 DAYS  Drug Screen Cutoff Levels:  AMPHETAMINE/METHAMPHETAMINES  1000 ng/mL  BARBITURATES     200 ng/mL  BENZODIAZEPINES     200 ng/mL  COCAINE      300 ng/mL  METHADONE      300 ng/mL  OPIATES      300 ng/mL  PHENCYCLIDINE     25 ng/mL  THC       50 ng/mL  OXYCODONE      100 ng/mL                         ED Course as of Feb 28 2117   Sun Feb 28, 2021 2113 Patient has not vomited since he was signed out to me at 1900  He has not cooperated very much with the PO challenge but he has had several swallows of water over the past hour plus  At this point he is appropriate for discharge  In the meantime his uds did come back positive for thc (and opiates/oxycodone)  I suspect his symptoms are part of a cannabis hyperemesis syndrome  Procedures  MDM    Disposition  Final diagnoses:   Generalized abdominal pain   Nausea and vomiting     Time reflects when diagnosis was documented in both MDM as applicable and the Disposition within this note     Time User Action Codes Description Comment    2/28/2021  7:01 PM Vitaliy Fairbanks Add [R10 84] Generalized abdominal pain     2/28/2021  7:01 PM Vitaliy Fairbanks Add [R11 2] Nausea and vomiting       ED Disposition     ED Disposition Condition Date/Time Comment    Discharge Stable [place] Feb 28, 2021  9:15 PM Pérez Jules discharge to home/self care              Follow-up Information     Follow up With Specialties Details Why Contact Info Additional [facility] [place] Schedule an appointment as soon as possible for a visit in 1 week  [address] [facility]  [phone] [facility] [place], [address], [address] [phone]        Patient's Medications   Discharge Prescriptions    No medications on file     No discharge procedures on file         ED Provider  Electronically Signed by     Lulu Turner MD  02/28/21 4678

## 2021-03-03 ENCOUNTER — HOSPITAL ENCOUNTER (EMERGENCY)
Facility: HOSPITAL | Age: 31
Discharge: HOME | End: 2021-03-03
Attending: EMERGENCY MEDICINE
Payer: COMMERCIAL

## 2021-03-03 ENCOUNTER — TELEPHONE (OUTPATIENT)
Dept: CT IMAGING | Facility: HOSPITAL | Age: 31
End: 2021-03-03

## 2021-03-03 ENCOUNTER — APPOINTMENT (EMERGENCY)
Dept: CT IMAGING | Facility: HOSPITAL | Age: 31
End: 2021-03-03
Payer: COMMERCIAL

## 2021-03-03 VITALS
HEART RATE: 55 BPM | DIASTOLIC BLOOD PRESSURE: 58 MMHG | RESPIRATION RATE: 16 BRPM | TEMPERATURE: 98.5 F | SYSTOLIC BLOOD PRESSURE: 126 MMHG | OXYGEN SATURATION: 98 % | WEIGHT: 180 LBS | BODY MASS INDEX: 25.83 KG/M2

## 2021-03-03 DIAGNOSIS — E87.6 HYPOKALEMIA: ICD-10-CM

## 2021-03-03 DIAGNOSIS — Z87.19 HISTORY OF DUODENAL ULCER: ICD-10-CM

## 2021-03-03 DIAGNOSIS — R10.13 EPIGASTRIC PAIN: Primary | ICD-10-CM

## 2021-03-03 DIAGNOSIS — R11.2 NAUSEA & VOMITING: ICD-10-CM

## 2021-03-03 LAB
ALBUMIN SERPL BCP-MCNC: 4.4 G/DL (ref 3.5–5)
ALP SERPL-CCNC: 61 U/L (ref 46–116)
ALT SERPL W P-5'-P-CCNC: 31 U/L (ref 12–78)
ANION GAP SERPL CALCULATED.3IONS-SCNC: 12 MMOL/L (ref 4–13)
APTT PPP: 28 SECONDS (ref 23–37)
AST SERPL W P-5'-P-CCNC: 15 U/L (ref 5–45)
ATRIAL RATE: 54 BPM
ATRIAL RATE: 55 BPM
BASOPHILS # BLD AUTO: 0.15 THOUSANDS/ΜL (ref 0–0.1)
BASOPHILS NFR BLD AUTO: 1 % (ref 0–1)
BILIRUB SERPL-MCNC: 1.06 MG/DL (ref 0.2–1)
BUN SERPL-MCNC: 13 MG/DL (ref 5–25)
CALCIUM SERPL-MCNC: 8.7 MG/DL (ref 8.3–10.1)
CHLORIDE SERPL-SCNC: 101 MMOL/L (ref 100–108)
CO2 SERPL-SCNC: 26 MMOL/L (ref 21–32)
CREAT SERPL-MCNC: 1.05 MG/DL (ref 0.6–1.3)
EOSINOPHIL # BLD AUTO: 0.02 THOUSAND/ΜL (ref 0–0.61)
EOSINOPHIL NFR BLD AUTO: 0 % (ref 0–6)
ERYTHROCYTE [DISTWIDTH] IN BLOOD BY AUTOMATED COUNT: 12.6 % (ref 11.6–15.1)
GFR SERPL CREATININE-BSD FRML MDRD: 95 ML/MIN/1.73SQ M
GLUCOSE SERPL-MCNC: 115 MG/DL (ref 65–140)
HCT VFR BLD AUTO: 42.9 % (ref 36.5–49.3)
HGB BLD-MCNC: 14.5 G/DL (ref 12–17)
HOLD SPECIMEN: NORMAL
IMM GRANULOCYTES # BLD AUTO: 0.05 THOUSAND/UL (ref 0–0.2)
IMM GRANULOCYTES NFR BLD AUTO: 0 % (ref 0–2)
INR PPP: 1.16 (ref 0.84–1.19)
LACTATE SERPL-SCNC: 1.1 MMOL/L (ref 0.5–2)
LIPASE SERPL-CCNC: 63 U/L (ref 73–393)
LYMPHOCYTES # BLD AUTO: 3.11 THOUSANDS/ΜL (ref 0.6–4.47)
LYMPHOCYTES NFR BLD AUTO: 22 % (ref 14–44)
MCH RBC QN AUTO: 28.1 PG (ref 26.8–34.3)
MCHC RBC AUTO-ENTMCNC: 33.8 G/DL (ref 31.4–37.4)
MCV RBC AUTO: 83 FL (ref 82–98)
MONOCYTES # BLD AUTO: 1.37 THOUSAND/ΜL (ref 0.17–1.22)
MONOCYTES NFR BLD AUTO: 10 % (ref 4–12)
NEUTROPHILS # BLD AUTO: 9.44 THOUSANDS/ΜL (ref 1.85–7.62)
NEUTS SEG NFR BLD AUTO: 67 % (ref 43–75)
NRBC BLD AUTO-RTO: 0 /100 WBCS
P AXIS: 39 DEGREES
P AXIS: 40 DEGREES
PLATELET # BLD AUTO: 196 THOUSANDS/UL (ref 149–390)
PMV BLD AUTO: 11.9 FL (ref 8.9–12.7)
POTASSIUM SERPL-SCNC: 3.3 MMOL/L (ref 3.5–5.3)
PR INTERVAL: 142 MS
PR INTERVAL: 164 MS
PROT SERPL-MCNC: 7.7 G/DL (ref 6.4–8.2)
PROTHROMBIN TIME: 15 SECONDS (ref 11.6–14.5)
QRS AXIS: 120 DEGREES
QRS AXIS: 130 DEGREES
QRSD INTERVAL: 94 MS
QRSD INTERVAL: 96 MS
QT INTERVAL: 444 MS
QT INTERVAL: 452 MS
QTC INTERVAL: 413 MS
QTC INTERVAL: 421 MS
RBC # BLD AUTO: 5.16 MILLION/UL (ref 3.88–5.62)
SODIUM SERPL-SCNC: 139 MMOL/L (ref 136–145)
T WAVE AXIS: 0 DEGREES
T WAVE AXIS: 1 DEGREES
TROPONIN I SERPL-MCNC: 0.04 NG/ML
VENTRICULAR RATE: 52 BPM
VENTRICULAR RATE: 52 BPM
WBC # BLD AUTO: 14.14 THOUSAND/UL (ref 4.31–10.16)

## 2021-03-03 PROCEDURE — 93010 ELECTROCARDIOGRAM REPORT: CPT | Performed by: INTERNAL MEDICINE

## 2021-03-03 PROCEDURE — 96374 THER/PROPH/DIAG INJ IV PUSH: CPT

## 2021-03-03 PROCEDURE — G1004 CDSM NDSC: HCPCS

## 2021-03-03 PROCEDURE — 96375 TX/PRO/DX INJ NEW DRUG ADDON: CPT

## 2021-03-03 PROCEDURE — 83690 ASSAY OF LIPASE: CPT | Performed by: EMERGENCY MEDICINE

## 2021-03-03 PROCEDURE — 85610 PROTHROMBIN TIME: CPT | Performed by: PHYSICIAN ASSISTANT

## 2021-03-03 PROCEDURE — 99284 EMERGENCY DEPT VISIT MOD MDM: CPT

## 2021-03-03 PROCEDURE — 93005 ELECTROCARDIOGRAM TRACING: CPT

## 2021-03-03 PROCEDURE — 80053 COMPREHEN METABOLIC PANEL: CPT | Performed by: EMERGENCY MEDICINE

## 2021-03-03 PROCEDURE — 96372 THER/PROPH/DIAG INJ SC/IM: CPT

## 2021-03-03 PROCEDURE — 36415 COLL VENOUS BLD VENIPUNCTURE: CPT

## 2021-03-03 PROCEDURE — 74177 CT ABD & PELVIS W/CONTRAST: CPT

## 2021-03-03 PROCEDURE — 96361 HYDRATE IV INFUSION ADD-ON: CPT

## 2021-03-03 PROCEDURE — 84484 ASSAY OF TROPONIN QUANT: CPT | Performed by: PHYSICIAN ASSISTANT

## 2021-03-03 PROCEDURE — 99285 EMERGENCY DEPT VISIT HI MDM: CPT | Performed by: PHYSICIAN ASSISTANT

## 2021-03-03 PROCEDURE — 85025 COMPLETE CBC W/AUTO DIFF WBC: CPT | Performed by: EMERGENCY MEDICINE

## 2021-03-03 PROCEDURE — 85730 THROMBOPLASTIN TIME PARTIAL: CPT | Performed by: PHYSICIAN ASSISTANT

## 2021-03-03 PROCEDURE — 83605 ASSAY OF LACTIC ACID: CPT | Performed by: PHYSICIAN ASSISTANT

## 2021-03-03 RX ORDER — FAMOTIDINE 20 MG/1
20 TABLET, FILM COATED ORAL 2 TIMES DAILY
Qty: 6 TABLET | Refills: 0 | Status: SHIPPED | OUTPATIENT
Start: 2021-03-03 | End: 2021-08-27 | Stop reason: SDUPTHER

## 2021-03-03 RX ORDER — POTASSIUM CHLORIDE 20 MEQ/1
40 TABLET, EXTENDED RELEASE ORAL ONCE
Status: COMPLETED | OUTPATIENT
Start: 2021-03-03 | End: 2021-03-03

## 2021-03-03 RX ORDER — SUCRALFATE 1 G/1
1 TABLET ORAL ONCE
Status: DISCONTINUED | OUTPATIENT
Start: 2021-03-03 | End: 2021-03-03 | Stop reason: HOSPADM

## 2021-03-03 RX ORDER — ONDANSETRON 2 MG/ML
4 INJECTION INTRAMUSCULAR; INTRAVENOUS ONCE
Status: COMPLETED | OUTPATIENT
Start: 2021-03-03 | End: 2021-03-03

## 2021-03-03 RX ORDER — SUCRALFATE 1 G/1
1 TABLET ORAL 4 TIMES DAILY
Qty: 12 TABLET | Refills: 0 | Status: SHIPPED | OUTPATIENT
Start: 2021-03-03 | End: 2021-08-30 | Stop reason: ALTCHOICE

## 2021-03-03 RX ORDER — MAGNESIUM HYDROXIDE/ALUMINUM HYDROXICE/SIMETHICONE 120; 1200; 1200 MG/30ML; MG/30ML; MG/30ML
30 SUSPENSION ORAL ONCE
Status: COMPLETED | OUTPATIENT
Start: 2021-03-03 | End: 2021-03-03

## 2021-03-03 RX ORDER — HALOPERIDOL 5 MG/ML
5 INJECTION INTRAMUSCULAR ONCE
Status: COMPLETED | OUTPATIENT
Start: 2021-03-03 | End: 2021-03-03

## 2021-03-03 RX ORDER — ONDANSETRON 4 MG/1
4 TABLET, FILM COATED ORAL EVERY 6 HOURS
Qty: 12 TABLET | Refills: 0 | Status: SHIPPED | OUTPATIENT
Start: 2021-03-03 | End: 2021-03-08 | Stop reason: SDUPTHER

## 2021-03-03 RX ADMIN — ONDANSETRON 4 MG: 2 INJECTION INTRAMUSCULAR; INTRAVENOUS at 03:04

## 2021-03-03 RX ADMIN — ALUMINA, MAGNESIA, AND SIMETHICONE ORAL SUSPENSION REGULAR STRENGTH 30 ML: 1200; 1200; 120 SUSPENSION ORAL at 04:15

## 2021-03-03 RX ADMIN — POTASSIUM CHLORIDE 40 MEQ: 1500 TABLET, EXTENDED RELEASE ORAL at 03:43

## 2021-03-03 RX ADMIN — SODIUM CHLORIDE 1000 ML: 0.9 INJECTION, SOLUTION INTRAVENOUS at 04:07

## 2021-03-03 RX ADMIN — HALOPERIDOL LACTATE 5 MG: 5 INJECTION, SOLUTION INTRAMUSCULAR at 05:11

## 2021-03-03 RX ADMIN — FAMOTIDINE 20 MG: 10 INJECTION, SOLUTION INTRAVENOUS at 04:07

## 2021-03-03 RX ADMIN — IOHEXOL 85 ML: 350 INJECTION, SOLUTION INTRAVENOUS at 05:48

## 2021-03-03 NOTE — DISCHARGE INSTRUCTIONS
Take Zofran, Carafate, and Pepcid as indicated  Follow-up with GI  Follow-up with PCP  Follow up emergency department symptoms persist or exacerbate

## 2021-03-03 NOTE — ED PROVIDER NOTES
History  Chief Complaint   Patient presents with    Abdominal Pain     Pt here for abdominal pain, states he was here a few days ago for stomach ulcer began throwing up tonight and since then has not felt well  Pt c/o abdominal pain and nausea  has not followed up with his GI yet  Patient is a 41-year-old male with history of duodenal ulcer no significant past surgical history that presents emergency department with epigastric burning persistent worsening nonradiating for 3 days  Patient has associated symptomatology of nausea symptoms beginning with current ED presentation of epigastric pain  Patient states that he has a history of a duodenal ulcers and was recently seen emergency department for symptoms similar to current on 02/28/2021; with positive UDS, and largely unremarkable clinical labs and discharged home care with follow-up to PCP and ED return precautions place  Patient states that he has tried Carafate in the past and with verbal refusal of Carafate at this time  Patient states that he is currently followed by GI  Patient affirms palliative factors of eating with provocative factors of pressure to epigastric area  Patient denies not effective treatment  Patient's fevers, chills, vomiting, diarrhea, constipation urinary symptoms  Patient's recent fall or recent trauma  Patient denies sick contacts or recent trauma  Patient denies chest pain and shortness of breath        History provided by:  Patient   used: No    Abdominal Pain  Pain location:  Epigastric  Pain quality: burning    Pain radiates to:  Does not radiate  Pain severity:  Mild  Onset quality:  Gradual  Duration:  3 days  Timing:  Constant  Progression:  Worsening  Chronicity:  Recurrent  Relieved by:  Eating  Worsened by:  Palpation  Ineffective treatments:  None tried  Associated symptoms: nausea    Associated symptoms: no chest pain, no chills, no constipation, no cough, no diarrhea, no dysuria, no fever, no shortness of breath, no sore throat and no vomiting        Prior to Admission Medications   Prescriptions Last Dose Informant Patient Reported? Taking?   ondansetron (ZOFRAN-ODT) 4 mg disintegrating tablet   No No   Sig: Take 1 tablet (4 mg total) by mouth every 6 (six) hours as needed for nausea or vomiting   pantoprazole (PROTONIX) 40 mg tablet   No No   Sig: Take 1 tablet (40 mg total) by mouth 2 (two) times a day before meals   sucralfate (CARAFATE) 1 g/10 mL suspension   No No   Sig: Take 10 mL (1 g total) by mouth 4 (four) times a day (with meals and at bedtime)      Facility-Administered Medications: None       Past Medical History:   Diagnosis Date    Duodenal ulcer        Past Surgical History:   Procedure Laterality Date    EGD         No family history on file  I have reviewed and agree with the history as documented  E-Cigarette/Vaping    E-Cigarette Use Never User      E-Cigarette/Vaping Substances     Social History     Tobacco Use    Smoking status: Former Smoker     Types: Cigarettes    Smokeless tobacco: Never Used   Substance Use Topics    Alcohol use: Not Currently    Drug use: Not Currently       Review of Systems   Constitutional: Negative for activity change, appetite change, chills and fever  HENT: Negative for congestion, postnasal drip, rhinorrhea, sinus pressure, sinus pain, sore throat and tinnitus  Eyes: Negative for photophobia and visual disturbance  Respiratory: Negative for cough, chest tightness and shortness of breath  Cardiovascular: Negative for chest pain and palpitations  Gastrointestinal: Positive for abdominal pain and nausea  Negative for constipation, diarrhea and vomiting  Genitourinary: Negative for difficulty urinating, dysuria, flank pain, frequency and urgency  Musculoskeletal: Negative for back pain, gait problem, neck pain and neck stiffness  Skin: Negative for pallor and rash     Allergic/Immunologic: Negative for environmental allergies and food allergies  Neurological: Negative for dizziness, weakness, numbness and headaches  Psychiatric/Behavioral: Negative for confusion  All other systems reviewed and are negative  Physical Exam  Physical Exam  Vitals signs and nursing note reviewed  Constitutional:       General: He is awake  Appearance: Normal appearance  He is well-developed  He is not ill-appearing, toxic-appearing or diaphoretic  Comments: /63 (BP Location: Right arm)   Pulse 61   Temp 98 5 °F (36 9 °C) (Oral)   Resp 16   Wt 81 6 kg (180 lb)   SpO2 96%   BMI 25 83 kg/m²      HENT:      Head: Normocephalic and atraumatic  Right Ear: Hearing and external ear normal  No decreased hearing noted  No drainage, swelling or tenderness  No mastoid tenderness  Left Ear: Hearing and external ear normal  No decreased hearing noted  No drainage, swelling or tenderness  No mastoid tenderness  Nose: Nose normal       Mouth/Throat:      Lips: Pink  Mouth: Mucous membranes are moist       Pharynx: Oropharynx is clear  Uvula midline  Eyes:      General: Lids are normal  Vision grossly intact  Right eye: No discharge  Left eye: No discharge  Extraocular Movements: Extraocular movements intact  Conjunctiva/sclera: Conjunctivae normal       Pupils: Pupils are equal, round, and reactive to light  Neck:      Musculoskeletal: Full passive range of motion without pain, normal range of motion and neck supple  Normal range of motion  No neck rigidity, spinous process tenderness or muscular tenderness  Vascular: No JVD  Trachea: Trachea and phonation normal  No tracheal tenderness or tracheal deviation  Cardiovascular:      Rate and Rhythm: Normal rate and regular rhythm  Pulses: Normal pulses  Radial pulses are 2+ on the right side and 2+ on the left side  Posterior tibial pulses are 2+ on the right side and 2+ on the left side        Heart sounds: Normal heart sounds  Pulmonary:      Effort: Pulmonary effort is normal       Breath sounds: Normal breath sounds  No stridor  No decreased breath sounds, wheezing, rhonchi or rales  Chest:      Chest wall: No tenderness  Abdominal:      General: Abdomen is flat  Bowel sounds are normal  There is no distension  Palpations: Abdomen is soft  Abdomen is not rigid  Tenderness: There is abdominal tenderness in the epigastric area  There is no guarding or rebound  Musculoskeletal: Normal range of motion  Lymphadenopathy:      Head:      Right side of head: No submental, submandibular, tonsillar, preauricular, posterior auricular or occipital adenopathy  Left side of head: No submental, submandibular, tonsillar, preauricular, posterior auricular or occipital adenopathy  Cervical: No cervical adenopathy  Right cervical: No superficial, deep or posterior cervical adenopathy  Left cervical: No superficial, deep or posterior cervical adenopathy  Skin:     General: Skin is warm  Capillary Refill: Capillary refill takes less than 2 seconds  Neurological:      General: No focal deficit present  Mental Status: He is alert and oriented to person, place, and time  GCS: GCS eye subscore is 4  GCS verbal subscore is 5  GCS motor subscore is 6  Sensory: No sensory deficit  Deep Tendon Reflexes: Reflexes are normal and symmetric  Reflex Scores:       Patellar reflexes are 2+ on the right side and 2+ on the left side  Psychiatric:         Mood and Affect: Mood normal          Speech: Speech normal          Behavior: Behavior normal  Behavior is cooperative  Thought Content:  Thought content normal          Judgment: Judgment normal          Vital Signs  ED Triage Vitals   Temperature Pulse Respirations Blood Pressure SpO2   03/03/21 0256 03/03/21 0254 03/03/21 0254 03/03/21 0254 03/03/21 0254   98 5 °F (36 9 °C) 61 16 145/63 96 %      Temp Source Heart Rate Source Patient Position - Orthostatic VS BP Location FiO2 (%)   03/03/21 0256 03/03/21 0254 03/03/21 0254 03/03/21 0254 --   Oral Monitor Sitting Right arm       Pain Score       03/03/21 0254       8           Vitals:    03/03/21 0254 03/03/21 0415 03/03/21 0515   BP: 145/63 123/58 126/58   Pulse: 61 64 55   Patient Position - Orthostatic VS: Sitting Sitting Lying         Visual Acuity      ED Medications  Medications   sucralfate (CARAFATE) tablet 1 g (1 g Oral Not Given 3/3/21 0513)   ondansetron (ZOFRAN) injection 4 mg (4 mg Intravenous Given 3/3/21 0304)   potassium chloride (K-DUR,KLOR-CON) CR tablet 40 mEq (40 mEq Oral Given 3/3/21 0343)   sodium chloride 0 9 % bolus 1,000 mL (0 mL Intravenous Stopped 3/3/21 0513)   famotidine (PEPCID) injection 20 mg (20 mg Intravenous Given 3/3/21 0407)   aluminum-magnesium hydroxide-simethicone (MYLANTA) oral suspension 30 mL (30 mL Oral Given 3/3/21 0415)   haloperidol lactate (HALDOL) injection 5 mg (5 mg Intramuscular Given 3/3/21 0511)   iohexol (OMNIPAQUE) 350 MG/ML injection (SINGLE-DOSE) 85 mL (85 mL Intravenous Given 3/3/21 0548)       Diagnostic Studies  Results Reviewed     Procedure Component Value Units Date/Time    Troponin I [[id number]]  (Normal) Collected: 03/03/21 0451    Lab Status: Final result Specimen: Blood from Arm, Right Updated: 03/03/21 0522     Troponin I 0 04 ng/mL     Lactic acid [[id number]]  (Normal) Collected: 03/03/21 0427    Lab Status: Final result Specimen: Blood Updated: 03/03/21 0450     LACTIC ACID 1 1 mmol/L     Narrative:      Result may be elevated if tourniquet was used during collection      Protime-INR [[id number]]  (Abnormal) Collected: 03/03/21 0304    Lab Status: Final result Specimen: Blood from Arm, Right Updated: 03/03/21 0416     Protime 15 0 seconds      INR 1 16    APTT [[id number]]  (Normal) Collected: 03/03/21 0304    Lab Status: Final result Specimen: Blood from Arm, Right Updated: 03/03/21 1036     PTT 28 seconds     Comprehensive metabolic panel [[id number]]  (Abnormal) Collected: 03/03/21 0304    Lab Status: Final result Specimen: Blood from Arm, Right Updated: 03/03/21 0328     Sodium 139 mmol/L      Potassium 3 3 mmol/L      Chloride 101 mmol/L      CO2 26 mmol/L      ANION GAP 12 mmol/L      BUN 13 mg/dL      Creatinine 1 05 mg/dL      Glucose 115 mg/dL      Calcium 8 7 mg/dL      AST 15 U/L      ALT 31 U/L      Alkaline Phosphatase 61 U/L      Total Protein 7 7 g/dL      Albumin 4 4 g/dL      Total Bilirubin 1 06 mg/dL      eGFR 95 ml/min/1 73sq m     Narrative:      National Kidney Disease Foundation guidelines for Chronic Kidney Disease (CKD):     Stage 1 with normal or high GFR (GFR > 90 mL/min/1 73 square meters)    Stage 2 Mild CKD (GFR = 60-89 mL/min/1 73 square meters)    Stage 3A Moderate CKD (GFR = 45-59 mL/min/1 73 square meters)    Stage 3B Moderate CKD (GFR = 30-44 mL/min/1 73 square meters)    Stage 4 Severe CKD (GFR = 15-29 mL/min/1 73 square meters)    Stage 5 End Stage CKD (GFR <15 mL/min/1 73 square meters)  Note: GFR calculation is accurate only with a steady state creatinine    Lipase [[id number]]  (Abnormal) Collected: 03/03/21 0304    Lab Status: Final result Specimen: Blood from Arm, Right Updated: 03/03/21 0328     Lipase 63 u/L     CBC and differential [[id number]]  (Abnormal) Collected: 03/03/21 0304    Lab Status: Final result Specimen: Blood from Arm, Right Updated: 03/03/21 0315     WBC 14 14 Thousand/uL      RBC 5 16 Million/uL      Hemoglobin 14 5 g/dL      Hematocrit 42 9 %      MCV 83 fL      MCH 28 1 pg      MCHC 33 8 g/dL      RDW 12 6 %      MPV 11 9 fL      Platelets 737 Thousands/uL      nRBC 0 /100 WBCs      Neutrophils Relative 67 %      Immat GRANS % 0 %      Lymphocytes Relative 22 %      Monocytes Relative 10 %      Eosinophils Relative 0 %      Basophils Relative 1 %      Neutrophils Absolute 9 44 Thousands/µL      Immature Grans Absolute 0 05 Thousand/uL Lymphocytes Absolute 3 11 Thousands/µL      Monocytes Absolute 1 37 Thousand/µL      Eosinophils Absolute 0 02 Thousand/µL      Basophils Absolute 0 15 Thousands/µL                  CT abdomen pelvis with contrast   ED Interpretation by Young Simmonds, PA-C (03/03 4053)   Mike Rios MD  [phone] 3/3/2021     Narrative & Impression    CT ABDOMEN AND PELVIS WITH IV CONTRAST     INDICATION:   Epigastric pain  EPIGASTRIC PAIN; NAUSEA      COMPARISON:  CT 11/1/2020      TECHNIQUE:  CT examination of the abdomen and pelvis was performed  Axial, sagittal, and coronal 2D reformatted images were created from the source data and submitted for interpretation      Radiation dose length product (DLP) for this visit:  352 mGy-cm   This examination, like all CT scans performed in the [facility], was performed utilizing techniques to minimize radiation dose exposure, including the use of iterative   reconstruction and automated exposure control      IV Contrast:  85 mL of iohexol (OMNIPAQUE)  Enteric Contrast:  Enteric contrast was not administered      FINDINGS:     ABDOMEN     LOWER CHEST:  No clinically significant abnormality identified in the visualized lower chest      LIVER/BILIARY TREE:  Unremarkable      GALLBLADDER:  No calcified ga   llstones  No pericholecystic inflammatory change      SPLEEN:  Unremarkable      PANCREAS:  Unremarkable      ADRENAL GLANDS:  Unremarkable      KIDNEYS/URETERS:  Unremarkable  No hydronephrosis      STOMACH AND BOWEL:  Unremarkable      APPENDIX:  No findings to suggest appendicitis      ABDOMINOPELVIC CAVITY:  No ascites  No pneumoperitoneum    No lymphadenopathy      VESSELS:  Unremarkable for patient's age      PELVIS     REPRODUCTIVE ORGANS:  Unremarkable for patient's age      URINARY BLADDER:  Unremarkable      ABDOMINAL WALL/INGUINAL REGIONS:  Unremarkable      OSSEOUS STRUCTURES:  No acute fracture or destructive osseous lesion      IMPRESSION:     No acute pathology            Workstation performed: [id number]          Final Result by Nisreen Rome MD (03/03 0728)      No acute pathology  Workstation performed: [id number]                    Procedures  ECG 12 Lead Documentation Only    Date/Time: 3/3/2021 4:14 AM  Performed by: Mckenzie Amador PA-C  Authorized by: Mckenzie Amador PA-C     Indications / Diagnosis:  Epigastric pain  ECG reviewed by me, the ED Provider: yes    Patient location:  ED  Previous ECG:     Previous ECG:  Compared to current    Similarity:  No change    Comparison to cardiac monitor: Yes    Interpretation:     Interpretation: normal    Rate:     ECG rate:  52    ECG rate assessment: bradycardic    Rhythm:     Rhythm: sinus bradycardia    Ectopy:     Ectopy: none    QRS:     QRS axis:  Right    QRS intervals:  Normal  Conduction:     Conduction: normal    ST segments:     ST segments:  Normal  T waves:     T waves: normal    Comments:      I disagree with computer interpretation of acute MI  ECG 12 Lead Documentation Only    Date/Time: 3/3/2021 4:49 AM  Performed by: Mckenzie Amador PA-C  Authorized by: Mckenzie Amador PA-C     Indications / Diagnosis:  Epigastric pain  ECG reviewed by me, the ED Provider: yes    Patient location:  ED  Previous ECG:     Previous ECG:  Compared to current    Similarity:  No change    Comparison to cardiac monitor: Yes    Interpretation:     Interpretation: normal    Rate:     ECG rate:  52    ECG rate assessment: bradycardic    Rhythm:     Rhythm: sinus rhythm    Ectopy:     Ectopy: none    QRS:     QRS axis:  Right    QRS intervals:  Normal  Conduction:     Conduction: normal    ST segments:     ST segments:  Normal  T waves:     T waves: normal               ED Course                             SBIRT 20yo+      Most Recent Value   SBIRT (24 yo +)   In order to provide better care to our patients, we are screening all of our patients for alcohol and drug use   Would it be okay to ask you these screening questions? Unable to answer at this time Filed at: 03/03/2021 0256                    [facility]  Number of Diagnoses or Management Options  Epigastric pain: new and does not require workup  History of duodenal ulcer: new and does not require workup  Hypokalemia: new and does not require workup  Nausea & vomiting: new and does not require workup     Amount and/or Complexity of Data Reviewed  Clinical lab tests: ordered and reviewed  Tests in the radiology section of CPT®: ordered and reviewed  Review and summarize past medical records: yes  Independent visualization of images, tracings, or specimens: yes    Risk of Complications, Morbidity, and/or Mortality  Presenting problems: moderate  Diagnostic procedures: moderate  Management options: low    Patient Progress  Patient progress: stable     Patient is a 81-year-old male with history of duodenal ulcer no significant past surgical history that presents emergency department with epigastric burning persistent worsening nonradiating for 3 days  Patient has associated symptomatology of nausea symptoms beginning with current ED presentation of epigastric pain     Patient hemodynamically stable and afebrile  ECG with normal sinus rhythm with negative troponin  Slight leukocytosis, no banding; no lactic acidosis  Hypokalemia with potassium 3 3 with K-Dur delivered  CT abdomen pelvis with contrast-impression-no acute pathology "  Delivered Mylanta, Pepcid, Haldol, Zofran with patient verbalized decrease in abdominal pain and nausea symptoms status post medication delivery  Patient denies urinary symptoms  Prescribed Pepcid, Zofran, Haldol, Carafate and counseled patient medication administration and side effects  Advised patient to consume small meals throughout the day and follow-up with GI further evaluation  Follow-up with GI  Follow-up with emergency department symptoms persist or exacerbate  Follow-up with PCP  Patient verbal understanding all clinical laboratory imaging findings, discharge instructions, follow-up verbalized agreement current treatment plan  Disposition  Final diagnoses:   Epigastric pain   Nausea & vomiting   Hypokalemia   History of duodenal ulcer     Time reflects when diagnosis was documented in both MDM as applicable and the Disposition within this note     Time User Action Codes Description Comment    3/3/2021  6:27 AM Yvrose Forget Add [R10 13] Epigastric pain     3/3/2021  6:27 AM Yvrose Forget Add [R11 2] Nausea & vomiting     3/3/2021  6:27 AM Yvrose Forget Add [E87 6] Hypokalemia     3/3/2021  6:27 AM Yvrose Forget Add [Z87 19] History of duodenal ulcer       ED Disposition     ED Disposition Condition Date/Time Comment    Discharge Stable Wed Mar 3, 2021  6:31 AM Teri Jules discharge to home/self care              Follow-up Information     Follow up With Specialties Details Why Contact Info Additional Information    Arpita Mcgee DO Family Medicine Call in 1 week for further evaluation of symptoms [address] [facility] [phone]       [address] Emergency Department Emergency Medicine Go to  As needed [address] Emergency Department,  [address], [place], [address]    Lena Pyle PA-C Gastroenterology, Physician Assistant Call in 2 days for further evaluation of symptoms [address] [facility] 105  [phone]             Discharge Medication List as of 3/3/2021  6:32 AM      START taking these medications    Details   famotidine (PEPCID) 20 mg tablet Take 1 tablet (20 mg total) by mouth 2 (two) times a day for 3 days, Starting Wed 3/3/2021, Until Sat 3/6/2021, Normal      ondansetron (ZOFRAN) 4 mg tablet Take 1 tablet (4 mg total) by mouth every 6 (six) hours for 3 days, Starting Wed 3/3/2021, Until Sat 3/6/2021, Normal      sucralfate (CARAFATE) 1 g tablet Take 1 tablet (1 g total) by mouth 4 (four) times a day for 3 days, Starting Wed 3/3/2021, Until Sat 3/6/2021, Normal         CONTINUE these medications which have NOT CHANGED    Details   ondansetron (ZOFRAN-ODT) 4 mg disintegrating tablet Take 1 tablet (4 mg total) by mouth every 6 (six) hours as needed for nausea or vomiting, Starting Fri 10/30/2020, Normal      pantoprazole (PROTONIX) 40 mg tablet Take 1 tablet (40 mg total) by mouth 2 (two) times a day before meals, Starting Mon 11/2/2020, Normal      sucralfate (CARAFATE) 1 g/10 mL suspension Take 10 mL (1 g total) by mouth 4 (four) times a day (with meals and at bedtime), Starting Fri 10/30/2020, Until Sun 11/29/2020, Normal           No discharge procedures on file      PDMP Review       Value Time User    PDMP Reviewed  Yes 11/1/2020  1:49 AM Darylene Knee, MD          ED Provider  Electronically Signed by           Jolene Chun PA-C  03/03/21 [id number] [facility]  03/03/21 6652

## 2021-03-03 NOTE — Clinical Note
Sanjuana Sylwiajose alfredo was seen and treated in our emergency department on 3/3/2021  Diagnosis:     Bimal Browne    He may return on this date: 03/05/2021         If you have any questions or concerns, please don't hesitate to call        Yajaira De Leon PA-C    ______________________________           _______________          _______________  Hospital Representative                              Date                                Time

## 2021-03-08 ENCOUNTER — TELEPHONE (OUTPATIENT)
Dept: GASTROENTEROLOGY | Facility: AMBULARY SURGERY CENTER | Age: 31
End: 2021-03-08

## 2021-03-08 DIAGNOSIS — R11.2 NAUSEA AND VOMITING, INTRACTABILITY OF VOMITING NOT SPECIFIED, UNSPECIFIED VOMITING TYPE: Primary | ICD-10-CM

## 2021-03-08 RX ORDER — PROMETHAZINE HYDROCHLORIDE 12.5 MG/1
12.5 TABLET ORAL EVERY 6 HOURS PRN
Qty: 30 TABLET | Refills: 0 | Status: SHIPPED | OUTPATIENT
Start: 2021-03-08 | End: 2021-08-27 | Stop reason: SDUPTHER

## 2021-03-08 NOTE — TELEPHONE ENCOUNTER
Patients GI provider:  Dr Delonte Davis     Number to return call: [phone]     Reason for call: Pt calling requesting to speak with someone regarding sleepless due to pain       Scheduled procedure/appointment date if applicable: Apt/procedure 3/17/21

## 2021-03-08 NOTE — TELEPHONE ENCOUNTER
Hx duodenal ulcer, abdominal pain, n/v    Patient called to request increasing his zofran for persistent nausea that is interrupting his sleep  Follow up visit scheduled 3/17/21  Constant nausea, intermittent burning mid abdominal pain 6-9/10   1-2 loose to formed BMs daily with mucous  Following bland diet  Taking pantoprazole and pepcid twice daily  Zofran 4 mg script from ED -used all that he was given  Carafate does not help him

## 2021-03-08 NOTE — TELEPHONE ENCOUNTER
Patient is agreeable to trial phenergan- please send script  Reviewed attached provider recommendations with patient

## 2021-03-08 NOTE — TELEPHONE ENCOUNTER
Please advise we can try a different antiemetic such as phenergan, as his zofran dose of 4 mg q 6 hrs shouldn't be increased beyond that level  Should avoid alcohol, marijuana, narcotics, NSAIDs, and continue PPI twice daily, and follow up for office visit as scheduled  I'll send Rx if he agrees    Thanks

## 2021-03-17 ENCOUNTER — OFFICE VISIT (OUTPATIENT)
Dept: GASTROENTEROLOGY | Facility: AMBULARY SURGERY CENTER | Age: 31
End: 2021-03-17
Payer: COMMERCIAL

## 2021-03-17 VITALS
DIASTOLIC BLOOD PRESSURE: 80 MMHG | BODY MASS INDEX: 24.2 KG/M2 | WEIGHT: 169 LBS | SYSTOLIC BLOOD PRESSURE: 138 MMHG | HEIGHT: 70 IN

## 2021-03-17 DIAGNOSIS — R10.10 PAIN OF UPPER ABDOMEN: Primary | ICD-10-CM

## 2021-03-17 PROCEDURE — 99214 OFFICE O/P EST MOD 30 MIN: CPT | Performed by: INTERNAL MEDICINE

## 2021-03-17 NOTE — PROGRESS NOTES
Follow-up Note -  [facility]  Sharla Morales 1990 male         Reason:  Follow-up    HPI:  Mr Manas White was seen by our service in the hospital in November when he was admitted with an episode of abdominal pain associated with nausea, vomiting  He reports having these flare ups 3 times in the past year and the recent 1 was couple of weeks ago when he was seen in the ER  The symptoms usually last for 3-4 days and then he becomes normal   He also remembers having psychological stress and arguments the night prior to the procedures  He used to have these episodes once year in the past years and was told about duodenal ulcer 5 years ago when he was given Dexilant with help  He was seen by Dr Didier Sandhu at [address] [place] [address] and had endoscopy which showed duodenal ulcers  H pylori antibody was negative  He reports having regular bowel movements and denies any blood in the stool  Denies any NSAID use  REVIEW OF SYSTEMS: Review of Systems   Constitutional: Negative for activity change, appetite change, chills, diaphoresis, fatigue, fever and unexpected weight change  HENT: Negative for ear discharge, ear pain, facial swelling, hearing loss, nosebleeds, sore throat, tinnitus and voice change  Eyes: Negative for pain, discharge, redness, itching and visual disturbance  Respiratory: Negative for apnea, cough, chest tightness, shortness of breath and wheezing  Cardiovascular: Negative for chest pain and palpitations  Gastrointestinal:        As noted in HPI   Endocrine: Negative for cold intolerance, heat intolerance and polyuria  Genitourinary: Negative for difficulty urinating, dysuria, flank pain, hematuria and urgency  Musculoskeletal: Negative for arthralgias, back pain, gait problem, joint swelling and myalgias  Skin: Negative for rash and wound     Neurological: Negative for dizziness, tremors, seizures, speech difficulty, light-headedness, numbness and headaches  Hematological: Negative for adenopathy  Does not bruise/bleed easily  Psychiatric/Behavioral: Negative for agitation, behavioral problems and confusion  The patient is not nervous/anxious  Past Medical History:   Diagnosis Date    Duodenal ulcer       Past Surgical History:   Procedure Laterality Date    EGD      UPPER GASTROINTESTINAL ENDOSCOPY       Social History     Socioeconomic History    Marital status: Single     Spouse name: Not on file    Number of children: Not on file    Years of education: Not on file    Highest education level: Not on file   Occupational History    Not on file   Social Needs    Financial resource strain: Not on file    Food insecurity     Worry: Not on file     Inability: Not on file    Transportation needs     Medical: Not on file     Non-medical: Not on file   Tobacco Use    Smoking status: Former Smoker     Types: Cigarettes    Smokeless tobacco: Never Used   Substance and Sexual Activity    Alcohol use: Not Currently    Drug use: Not Currently     Comment: Marijuana - stopped 1 year ago    Sexual activity: Not on file   Lifestyle    Physical activity     Days per week: Not on file     Minutes per session: Not on file    Stress: Not on file   Relationships    Social connections     Talks on phone: Not on file     Gets together: Not on file     Attends Restoration service: Not on file     Active member of club or organization: Not on file     Attends meetings of clubs or organizations: Not on file     Relationship status: Not on file    Intimate partner violence     Fear of current or ex partner: Not on file     Emotionally abused: Not on file     Physically abused: Not on file     Forced sexual activity: Not on file   Other Topics Concern    Not on file   Social History Narrative    Not on file     Family History   Problem Relation Age of Onset    Lung cancer Maternal Grandmother      Patient has no known allergies    Current Outpatient Medications   Medication Sig Dispense Refill    pantoprazole (PROTONIX) 40 mg tablet Take 1 tablet (40 mg total) by mouth 2 (two) times a day before meals 60 tablet 5    promethazine (PHENERGAN) 12 5 MG tablet Take 1 tablet (12 5 mg total) by mouth every 6 (six) hours as needed for nausea or vomiting 30 tablet 0    famotidine (PEPCID) 20 mg tablet Take 1 tablet (20 mg total) by mouth 2 (two) times a day for 3 days 6 tablet 0    sucralfate (CARAFATE) 1 g tablet Take 1 tablet (1 g total) by mouth 4 (four) times a day for 3 days 12 tablet 0    sucralfate (CARAFATE) 1 g/10 mL suspension Take 10 mL (1 g total) by mouth 4 (four) times a day (with meals and at bedtime) 1200 mL 0     No current facility-administered medications for this visit  Blood pressure 138/80, height 5' 10" (1 778 m), weight 76 7 kg (169 lb)  PHYSICAL EXAM: Physical Exam  Constitutional:       Appearance: He is well-developed  HENT:      Head: Normocephalic and atraumatic  Eyes:      General: No scleral icterus  Right eye: No discharge  Left eye: No discharge  Conjunctiva/sclera: Conjunctivae normal       Pupils: Pupils are equal, round, and reactive to light  Neck:      Musculoskeletal: Neck supple  Thyroid: No thyromegaly  Vascular: No JVD  Trachea: No tracheal deviation  Cardiovascular:      Rate and Rhythm: Normal rate and regular rhythm  Heart sounds: Normal heart sounds  No murmur  No friction rub  No gallop  Pulmonary:      Effort: Pulmonary effort is normal  No respiratory distress  Breath sounds: Normal breath sounds  No wheezing or rales  Chest:      Chest wall: No tenderness  Abdominal:      General: Bowel sounds are normal  There is no distension  Palpations: Abdomen is soft  There is no mass  Tenderness: There is no abdominal tenderness  There is no guarding or rebound  Hernia: No hernia is present     Lymphadenopathy:      Cervical: No cervical adenopathy  Skin:     General: Skin is warm and dry  Findings: No erythema or rash  Neurological:      Mental Status: He is alert and oriented to person, place, and time  Psychiatric:         Behavior: Behavior normal          Thought Content: Thought content normal           Lab Results   Component Value Date    WBC 14 14 (H) 03/03/2021    HGB 14 5 03/03/2021    HCT 42 9 03/03/2021    MCV 83 03/03/2021     03/03/2021     Lab Results   Component Value Date    CALCIUM 8 7 03/03/2021    K 3 3 (L) 03/03/2021    CO2 26 03/03/2021     03/03/2021    BUN 13 03/03/2021    CREATININE 1 05 03/03/2021     Lab Results   Component Value Date    ALT 31 03/03/2021    AST 15 03/03/2021    ALKPHOS 61 03/03/2021     Lab Results   Component Value Date    INR 1 16 03/03/2021    INR 1 07 11/01/2020    PROTIME 15 0 (H) 03/03/2021    PROTIME 14 1 11/01/2020       Ct Abdomen Pelvis With Contrast    Result Date: 3/3/2021  Impression: No acute pathology  Workstation performed: [id number]       ASSESSMENT & PLAN:    Pain of upper abdomen  History of duodenal ulcers in the previous upper endoscopies  He has been taking Protonix twice daily  He had an episode couple of weeks ago when he was seen in the ER and had negative CT scan/ lab workup  He has been doing well since then  It appears that he had some stress and arguments the night prior to the onset of symptoms which could be due to functional from irritable bowel syndrome  Doubt for biliary pathology  Rule out food allergies    -  Explained to patient in detail about different possible etiologies and given reassurance  - check celiac disease panel and food allergy profile    - schedule for EGD    - check right upper quadrant ultrasound    - Patient was explained about the lifestyle and dietary modifications  Advised to avoid fatty foods, chocolates, caffeine, alcohol and any other triggering foods    Avoid eating for at least 3 hours before going to bed         -Patient was explained about  the risks and benefits of the procedure  Risks including but not limited to bleeding, infection, perforation were explained in detail  Also explained about less than 100% sensitivity with the exam and other alternatives

## 2021-03-17 NOTE — ASSESSMENT & PLAN NOTE
History of duodenal ulcers in the previous upper endoscopies  He has been taking Protonix twice daily  He had an episode couple of weeks ago when he was seen in the ER and had negative CT scan/ lab workup  He has been doing well since then  It appears that he had some stress and arguments the night prior to the onset of symptoms which could be due to functional from irritable bowel syndrome  Doubt for biliary pathology  Rule out food allergies    -  Explained to patient in detail about different possible etiologies and given reassurance  - check celiac disease panel and food allergy profile    - schedule for EGD    - check right upper quadrant ultrasound    - Patient was explained about the lifestyle and dietary modifications  Advised to avoid fatty foods, chocolates, caffeine, alcohol and any other triggering foods  Avoid eating for at least 3 hours before going to bed         -Patient was explained about  the risks and benefits of the procedure  Risks including but not limited to bleeding, infection, perforation were explained in detail  Also explained about less than 100% sensitivity with the exam and other alternatives

## 2021-04-11 ENCOUNTER — HOSPITAL ENCOUNTER (OUTPATIENT)
Dept: ULTRASOUND IMAGING | Facility: HOSPITAL | Age: 31
Discharge: HOME | End: 2021-04-11
Attending: INTERNAL MEDICINE
Payer: COMMERCIAL

## 2021-04-11 DIAGNOSIS — R10.10 PAIN OF UPPER ABDOMEN: ICD-10-CM

## 2021-04-11 PROCEDURE — 76705 ECHO EXAM OF ABDOMEN: CPT

## 2021-04-19 ENCOUNTER — TELEPHONE (OUTPATIENT)
Dept: GASTROENTEROLOGY | Facility: CLINIC | Age: 31
End: 2021-04-19

## 2021-04-19 NOTE — TELEPHONE ENCOUNTER
----- Message from Maritza Toth MD sent at 4/18/2021  2:10 PM EDT -----  Ultrasound is normal   Proceed with upper endoscopy as planned

## 2021-05-17 ENCOUNTER — ANESTHESIA (OUTPATIENT)
Dept: GASTROENTEROLOGY | Facility: HOSPITAL | Age: 31
End: 2021-05-17

## 2021-05-17 ENCOUNTER — HOSPITAL ENCOUNTER (OUTPATIENT)
Dept: GASTROENTEROLOGY | Facility: HOSPITAL | Age: 31
Setting detail: OUTPATIENT SURGERY
Discharge: HOME | End: 2021-05-17
Attending: INTERNAL MEDICINE
Payer: COMMERCIAL

## 2021-05-17 ENCOUNTER — ANESTHESIA EVENT (OUTPATIENT)
Dept: GASTROENTEROLOGY | Facility: HOSPITAL | Age: 31
End: 2021-05-17

## 2021-05-17 VITALS
TEMPERATURE: 97.3 F | SYSTOLIC BLOOD PRESSURE: 110 MMHG | RESPIRATION RATE: 16 BRPM | BODY MASS INDEX: 25.2 KG/M2 | OXYGEN SATURATION: 100 % | HEIGHT: 70 IN | HEART RATE: 57 BPM | WEIGHT: 176 LBS | DIASTOLIC BLOOD PRESSURE: 56 MMHG

## 2021-05-17 DIAGNOSIS — R11.2 NAUSEA AND VOMITING, INTRACTABILITY OF VOMITING NOT SPECIFIED, UNSPECIFIED VOMITING TYPE: Primary | ICD-10-CM

## 2021-05-17 DIAGNOSIS — R10.10 PAIN OF UPPER ABDOMEN: ICD-10-CM

## 2021-05-17 DIAGNOSIS — R11.2 NAUSEA AND VOMITING, INTRACTABILITY OF VOMITING NOT SPECIFIED, UNSPECIFIED VOMITING TYPE: ICD-10-CM

## 2021-05-17 PROCEDURE — 43235 EGD DIAGNOSTIC BRUSH WASH: CPT | Performed by: INTERNAL MEDICINE

## 2021-05-17 RX ORDER — SODIUM CHLORIDE, SODIUM LACTATE, POTASSIUM CHLORIDE, CALCIUM CHLORIDE 600; 310; 30; 20 MG/100ML; MG/100ML; MG/100ML; MG/100ML
INJECTION, SOLUTION INTRAVENOUS CONTINUOUS PRN
Status: DISCONTINUED | OUTPATIENT
Start: 2021-05-17 | End: 2021-05-17

## 2021-05-17 RX ORDER — PROPOFOL 10 MG/ML
INJECTION, EMULSION INTRAVENOUS AS NEEDED
Status: DISCONTINUED | OUTPATIENT
Start: 2021-05-17 | End: 2021-05-17

## 2021-05-17 RX ORDER — LIDOCAINE HYDROCHLORIDE 10 MG/ML
INJECTION, SOLUTION EPIDURAL; INFILTRATION; INTRACAUDAL; PERINEURAL AS NEEDED
Status: DISCONTINUED | OUTPATIENT
Start: 2021-05-17 | End: 2021-05-17

## 2021-05-17 RX ADMIN — PROPOFOL 150 MG: 10 INJECTION, EMULSION INTRAVENOUS at 11:54

## 2021-05-17 RX ADMIN — LIDOCAINE HYDROCHLORIDE 50 MG: 10 INJECTION, SOLUTION EPIDURAL; INFILTRATION; INTRACAUDAL at 11:54

## 2021-05-17 RX ADMIN — PROPOFOL 50 MG: 10 INJECTION, EMULSION INTRAVENOUS at 11:55

## 2021-05-17 RX ADMIN — SODIUM CHLORIDE, SODIUM LACTATE, POTASSIUM CHLORIDE, AND CALCIUM CHLORIDE: .6; .31; .03; .02 INJECTION, SOLUTION INTRAVENOUS at 11:49

## 2021-05-17 NOTE — ANESTHESIA POSTPROCEDURE EVALUATION
Post-Op Assessment Note    CV Status:  Stable    Pain management: adequate     Mental Status:  Alert and awake   Hydration Status:  Euvolemic   PONV Controlled:  Controlled   Airway Patency:  Patent      Post Op Vitals Reviewed: Yes      Staff: CRNA   Comments: vss report rn        No complications documented      BP   117/57   Temp      Pulse  61   Resp      SpO2   100

## 2021-05-17 NOTE — ANESTHESIA PREPROCEDURE EVALUATION
Procedure:  EGD    Relevant Problems   GI/HEPATIC   (+) Duodenal ulcer        Physical Exam    Airway    Mallampati score: II  TM Distance: >3 FB  Neck ROM: full     Dental       Cardiovascular  Cardiovascular exam normal    Pulmonary  Pulmonary exam normal     Other Findings        Anesthesia Plan  ASA Score- 2     Anesthesia Type- IV sedation with anesthesia with ASA Monitors  Additional Monitors:   Airway Plan:           Plan Factors-Exercise tolerance (METS): >4 METS  Chart reviewed  EKG reviewed  Imaging results reviewed  Existing labs reviewed  Patient summary reviewed  Induction- intravenous  Postoperative Plan-     Informed Consent- Anesthetic plan and risks discussed with patient  I personally reviewed this patient with the CRNA  Discussed and agreed on the Anesthesia Plan with the CRNA  Anahy Reaves

## 2021-05-17 NOTE — H&P
History and Physical -  [facility]  Courtney Grayson 35 y o  male MRN: [id number]                  HPI: Courtney Grayson is a 35y o  year old male who presents for EGD for abdominal pain, nausea and vomiting  REVIEW OF SYSTEMS: Per the HPI, and otherwise unremarkable  Historical Information   Past Medical History:   Diagnosis Date    Duodenal ulcer      Past Surgical History:   Procedure Laterality Date    EGD      UPPER GASTROINTESTINAL ENDOSCOPY       Social History   Social History     Substance and Sexual Activity   Alcohol Use Not Currently     Social History     Substance and Sexual Activity   Drug Use Not Currently    Comment: Marijuana - stopped 1 year ago     Social History     Tobacco Use   Smoking Status Former Smoker    Types: Cigarettes   Smokeless Tobacco Never Used     Family History   Problem Relation Age of Onset    Lung cancer Maternal Grandmother        Meds/Allergies     (Not in a hospital admission)      No Known Allergies    Objective     There were no vitals taken for this visit  PHYSICAL EXAM    Gen: NAD  CV: RRR  CHEST: Clear  ABD: soft, NT/ND  EXT: no edema      ASSESSMENT/PLAN:  This is a 35y o  year old male here for EGD for abdominal pain, nausea and vomiting, and he is stable and optimized for his procedure        Brett Martínez MD

## 2021-07-30 ENCOUNTER — NURSE TRIAGE (OUTPATIENT)
Dept: OTHER | Facility: OTHER | Age: 31
End: 2021-07-30

## 2021-07-31 NOTE — TELEPHONE ENCOUNTER
Regarding: Medication Refill  ----- Message from Christopher Obrien sent at 7/30/2021  8:11 PM EDT -----  "I need a refill of my pantoprazole 40 mg   The pharmacy said I didn't have any left "

## 2021-07-31 NOTE — TELEPHONE ENCOUNTER
Reason for Disposition   [1] Prescription refill request for NON-ESSENTIAL medicine (i e , no harm to patient if med not taken) AND [2] triager unable to refill per department policy    Answer Assessment - Initial Assessment Questions  1  NAME of MEDICATION: "What medicine are you calling about?"      pantaprozole   2  QUESTION: "What is your question?" (e g , medication refill, side effect)      Can I have a refill  3  PRESCRIBING HCP: "Who prescribed it?" Reason: if prescribed by specialist, call should be referred to that group  pcp  4  SYMPTOMS: "Do you have any symptoms?"      none  5  SEVERITY: If symptoms are present, ask "Are they mild, moderate or severe?"      n/a  6   PREGNANCY:  "Is there any chance that you are pregnant?" "When was your last menstrual period?"      None    Protocols used: MEDICATION QUESTION CALL-ADULT-

## 2021-08-02 ENCOUNTER — APPOINTMENT (OUTPATIENT)
Dept: LAB | Facility: AMBULARY SURGERY CENTER | Age: 31
End: 2021-08-02
Attending: INTERNAL MEDICINE
Payer: COMMERCIAL

## 2021-08-02 ENCOUNTER — OFFICE VISIT (OUTPATIENT)
Dept: GASTROENTEROLOGY | Facility: AMBULARY SURGERY CENTER | Age: 31
End: 2021-08-02
Payer: COMMERCIAL

## 2021-08-02 VITALS
DIASTOLIC BLOOD PRESSURE: 68 MMHG | WEIGHT: 188 LBS | SYSTOLIC BLOOD PRESSURE: 112 MMHG | HEIGHT: 70 IN | BODY MASS INDEX: 26.92 KG/M2

## 2021-08-02 DIAGNOSIS — R10.13 EPIGASTRIC PAIN: ICD-10-CM

## 2021-08-02 DIAGNOSIS — R10.13 EPIGASTRIC PAIN: Primary | ICD-10-CM

## 2021-08-02 DIAGNOSIS — R10.10 PAIN OF UPPER ABDOMEN: ICD-10-CM

## 2021-08-02 PROCEDURE — 86003 ALLG SPEC IGE CRUDE XTRC EA: CPT

## 2021-08-02 PROCEDURE — 86255 FLUORESCENT ANTIBODY SCREEN: CPT

## 2021-08-02 PROCEDURE — 82784 ASSAY IGA/IGD/IGG/IGM EACH: CPT

## 2021-08-02 PROCEDURE — 36415 COLL VENOUS BLD VENIPUNCTURE: CPT

## 2021-08-02 PROCEDURE — 83516 IMMUNOASSAY NONANTIBODY: CPT

## 2021-08-02 PROCEDURE — 86008 ALLG SPEC IGE RECOMB EA: CPT

## 2021-08-02 PROCEDURE — 99214 OFFICE O/P EST MOD 30 MIN: CPT | Performed by: PHYSICIAN ASSISTANT

## 2021-08-02 PROCEDURE — 82785 ASSAY OF IGE: CPT

## 2021-08-02 NOTE — ASSESSMENT & PLAN NOTE
Recurrent episodes over the last several years, correlated with episodes of stress, patient reports history of duodenal ulcer but duodenum could not be evaluated at the time of EGD in May as there was food bolus noted in the stomach  He denies any known risk factor for gastroparesis such as diabetes, narcotic use, or history of thoracic surgeries, but gastroparesis nonetheless remains in the differential; he also may have duodenal ulcer which could not be visualized at the time of his EGD, could also be component of cyclic vomiting      He does appear clinically well since last visit and is on Protonix      -follow-up on results of upcoming gastric emptying study     -will check celiac disease profile and food allergy profile as previously recommended     -will also repeat EGD; advised patient to take only clear liquid diet for the day prior to procedure and remain nothing by mouth after midnight prior to procedure       -continue Protonix in the meantime     -advised patient about small frequent meals

## 2021-08-02 NOTE — PROGRESS NOTES
Follow-up Note -  [facility]  Carmen Guillory 1990 28 y o  male         Reason:  Follow-up; recurrent epigastric pain and vomiting, recent EGD with findings of food in the stomach    HPI:  30-year-old male with history of duodenal ulcer who presents for follow-up, he was seen in our office with Dr Ethel Meckel in March for evaluation of recurrent episodes of epigastric pain and vomiting, patient generally correlates with episodes of stress, he says this has been going on for about 4-5 years although he would go through many periods of relative normalcy, but also with flare ups that can last up to 7-10 days, characterized by burning generalized abdominal pain, frequent bilious vomiting, decrease in both oral intake and stool output, and loss of weight which patient reports he will usually gained back  He underwent EGD in May which actually showed food bolus in the stomach so procedure was aborted, and he was ordered for gastric emptying study, this is actually already scheduled for the end of this month  He was also ordered for celiac disease panel and food allergy panel which have yet to be completed  At this time, the patient says that he is continuing pantoprazole once daily and he reports to be doing well since his last visit, with no flare ups  Denies any vomiting, any significant diarrhea or constipation  Reports his father has GERD issues but he denies any family history of GI cancers or inflammatory bowel disease  He is no longer taking Pepcid or Carafate  REVIEW OF SYSTEMS:      CONSTITUTIONAL: Denies any fever, chills, or rigors  Good appetite, and no recent weight loss  HEENT: No earache or tinnitus  Denies hearing loss or visual disturbances  CARDIOVASCULAR: No chest pain or palpitations  RESPIRATORY: Denies any cough, hemoptysis, shortness of breath or dyspnea on exertion  GASTROINTESTINAL: As noted in the History of Present Illness     GENITOURINARY: No problems with urination  Denies any hematuria or dysuria  NEUROLOGIC: No dizziness or vertigo, denies headaches  MUSCULOSKELETAL: Denies any muscle or joint pain  SKIN: Denies skin rashes or itching  ENDOCRINE: Denies excessive thirst  Denies intolerance to heat or cold  PSYCHOSOCIAL: Denies depression or anxiety  Denies any recent memory loss  Past Medical History:   Diagnosis Date    Duodenal ulcer       Past Surgical History:   Procedure Laterality Date    EGD      UPPER GASTROINTESTINAL ENDOSCOPY       Social History     Socioeconomic History    Marital status: Single     Spouse name: Not on file    Number of children: Not on file    Years of education: Not on file    Highest education level: Not on file   Occupational History    Not on file   Tobacco Use    Smoking status: Former Smoker     Types: Cigarettes    Smokeless tobacco: Never Used   Vaping Use    Vaping Use: Never used   Substance and Sexual Activity    Alcohol use: Not Currently    Drug use: Not Currently     Comment: Marijuana - stopped 1 year ago    Sexual activity: Not on file   Other Topics Concern    Not on file   Social History Narrative    Not on file     Social Determinants of Health     Financial Resource Strain:     Difficulty of Paying Living Expenses:    Food Insecurity:     Worried About [address] in the Last Year:     [address] [place] in the Last Year:    Transportation Needs:     Lack of Transportation (Medical):      Lack of Transportation (Non-Medical):    Physical Activity:     Days of Exercise per Week:     Minutes of Exercise per Session:    Stress:     Feeling of Stress :    Social Connections:     Frequency of Communication with Friends and Family:     Frequency of Social Gatherings with Friends and Family:     Attends Judaism Services:     Active Member of Clubs or Organizations:     Attends Club or Organization Meetings:     Marital Status:    Intimate Partner Violence:     Fear of Current or Ex-Partner:     Emotionally Abused:     Physically Abused:     Sexually Abused:      Family History   Problem Relation Age of Onset    Lung cancer Maternal Grandmother      Patient has no known allergies  Current Outpatient Medications   Medication Sig Dispense Refill    pantoprazole (PROTONIX) 40 mg tablet TAKE ONE TABLET BY MOUTH TWICE DAILY BEFORE MEALS  60 tablet 5    promethazine (PHENERGAN) 12 5 MG tablet Take 1 tablet (12 5 mg total) by mouth every 6 (six) hours as needed for nausea or vomiting 30 tablet 0    famotidine (PEPCID) 20 mg tablet Take 1 tablet (20 mg total) by mouth 2 (two) times a day for 3 days 6 tablet 0    sucralfate (CARAFATE) 1 g tablet Take 1 tablet (1 g total) by mouth 4 (four) times a day for 3 days 12 tablet 0    sucralfate (CARAFATE) 1 g/10 mL suspension Take 10 mL (1 g total) by mouth 4 (four) times a day (with meals and at bedtime) 1200 mL 0     No current facility-administered medications for this visit  Blood pressure 112/68, height 5' 10" (1 778 m), weight 85 3 kg (188 lb)  PHYSICAL EXAM:      General Appearance:   Alert, cooperative, no distress, appears stated age    HEENT:   Normocephalic, atraumatic, anicteric      Neck:  Supple, symmetrical, trachea midline, no adenopathy;    thyroid: no enlargement/tenderness/nodules; no carotid  bruit or JVD    Lungs:   Clear to auscultation bilaterally; no rales, rhonchi or wheezing; respirations unlabored    Heart[de-identified]   S1 and S2 normal; regular rate and rhythm; no murmur, rub, or gallop     Abdomen:   Soft, non-tender, non-distended; normal bowel sounds; no masses, no organomegaly    Extremities: No edema, erythema, wounds, rashes   Rectal:   Deferred                      Lab Results   Component Value Date    WBC 14 14 (H) 03/03/2021    HGB 14 5 03/03/2021    HCT 42 9 03/03/2021    MCV 83 03/03/2021     03/03/2021     Lab Results   Component Value Date    CALCIUM 8 7 03/03/2021    K 3 3 (L) 03/03/2021 CO2 26 03/03/2021     03/03/2021    BUN 13 03/03/2021    CREATININE 1 05 03/03/2021     Lab Results   Component Value Date    ALT 31 03/03/2021    AST 15 03/03/2021    ALKPHOS 61 03/03/2021     Lab Results   Component Value Date    INR 1 16 03/03/2021    INR 1 07 11/01/2020    PROTIME 15 0 (H) 03/03/2021    PROTIME 14 1 11/01/2020       EGD    Result Date: 5/17/2021  Impression: Normal esophagus  Food in the stomach  Procedure stopped  RECOMMENDATION: Recommend gastric emptying study  Please call central scheduling at [phone] to schedule your radiology exam  Follow-up in GI clinic in 2-3 months  Nain Zamora MD      ASSESSMENT & PLAN:    Epigastric pain with nausea and vomiting  Recurrent episodes over the last several years, correlated with episodes of stress, patient reports history of duodenal ulcer but duodenum could not be evaluated at the time of EGD in May as there was food bolus noted in the stomach  He denies any known risk factor for gastroparesis such as diabetes, narcotic use, or history of thoracic surgeries, but gastroparesis nonetheless remains in the differential; he also may have duodenal ulcer which could not be visualized at the time of his EGD, could also be component of cyclic vomiting      He does appear clinically well since last visit and is on Protonix      -follow-up on results of upcoming gastric emptying study     -will check celiac disease profile and food allergy profile as previously recommended     -will also repeat EGD; advised patient to take only clear liquid diet for the day prior to procedure and remain nothing by mouth after midnight prior to procedure       -continue Protonix in the meantime     -advised patient about small frequent meals

## 2021-08-03 LAB
A-LACTALB IGE QN: 0.36 KAU/I
ALLERGEN COMMENT: ABNORMAL
ALMOND IGE QN: 0.43 KUA/I
ARA H6 PEANUT: <0.1 KUA/I
B-LACTOGLOB IGE QN: 0.12 KAU/I
CASEIN IGE QN: 0.34 KAU/I
CASHEW NUT IGE QN: <0.1 KUA/I
CODFISH IGE QN: <0.1 KUA/I
EGG WHITE IGE QN: 0.24 KUA/I
ENDOMYSIUM IGA SER QL: NEGATIVE
GLIADIN PEPTIDE IGA SER-ACNC: 3 UNITS (ref 0–19)
GLIADIN PEPTIDE IGG SER-ACNC: 3 UNITS (ref 0–19)
GLUTEN IGE QN: 1.89 KUA/I
HAZELNUT IGE QN: 0.35 KUA/L
IGA SERPL-MCNC: 153 MG/DL (ref 90–386)
MILK IGE QN: 2.5 KUA/I
OVALB IGE QN: 0.33 KAU/I
OVOMUCOID IGE QN: <0.1 KAU/I
PEANUT (RARA H) 1 IGE QN: <0.1 KUA/I
PEANUT (RARA H) 2 IGE QN: <0.1 KUA/I
PEANUT (RARA H) 3 IGE QN: 0.11 KUA/I
PEANUT (RARA H) 8 IGE QN: 0.1 KUA/I
PEANUT (RARA H) 9 IGE QN: <0.1 KUA/I
PEANUT IGE QN: 1 KUA/I
SALMON IGE QN: <0.1 KUA/I
SCALLOP IGE QN: <0.1 KUA/L
SESAME SEED IGE QN: 3.32 KUA/I
SHRIMP IGE QN: 1.08 KUA/L
SOYBEAN IGE QN: 0.4 KUA/I
TOTAL IGE SMQN RAST: 3941 KU/L (ref 0–113)
TTG IGA SER-ACNC: <2 U/ML (ref 0–3)
TTG IGG SER-ACNC: <2 U/ML (ref 0–5)
TUNA IGE QN: <0.1 KUA/I
WALNUT IGE QN: 0.23 KUA/I
WHEAT IGE QN: 11.2 KUA/I

## 2021-08-26 ENCOUNTER — TELEPHONE (OUTPATIENT)
Dept: GASTROENTEROLOGY | Facility: AMBULARY SURGERY CENTER | Age: 31
End: 2021-08-26

## 2021-08-26 ENCOUNTER — HOSPITAL ENCOUNTER (EMERGENCY)
Facility: HOSPITAL | Age: 31
Discharge: HOME | End: 2021-08-26
Attending: EMERGENCY MEDICINE | Admitting: EMERGENCY MEDICINE
Payer: COMMERCIAL

## 2021-08-26 VITALS
HEART RATE: 85 BPM | OXYGEN SATURATION: 99 % | TEMPERATURE: 99.9 F | DIASTOLIC BLOOD PRESSURE: 74 MMHG | RESPIRATION RATE: 18 BRPM | SYSTOLIC BLOOD PRESSURE: 136 MMHG

## 2021-08-26 DIAGNOSIS — R11.2 NAUSEA AND VOMITING, INTRACTABILITY OF VOMITING NOT SPECIFIED, UNSPECIFIED VOMITING TYPE: ICD-10-CM

## 2021-08-26 DIAGNOSIS — R10.13 ACUTE EPIGASTRIC PAIN: Primary | ICD-10-CM

## 2021-08-26 DIAGNOSIS — R10.13 EPIGASTRIC PAIN: ICD-10-CM

## 2021-08-26 LAB
ALBUMIN SERPL BCP-MCNC: 4.7 G/DL (ref 3.5–5)
ALP SERPL-CCNC: 84 U/L (ref 46–116)
ALT SERPL W P-5'-P-CCNC: 36 U/L (ref 12–78)
ANION GAP SERPL CALCULATED.3IONS-SCNC: 15 MMOL/L (ref 4–13)
AST SERPL W P-5'-P-CCNC: 20 U/L (ref 5–45)
BASOPHILS # BLD AUTO: 0.09 THOUSANDS/ΜL (ref 0–0.1)
BASOPHILS NFR BLD AUTO: 1 % (ref 0–1)
BILIRUB SERPL-MCNC: 0.68 MG/DL (ref 0.2–1)
BUN SERPL-MCNC: 14 MG/DL (ref 5–25)
CALCIUM SERPL-MCNC: 9.8 MG/DL (ref 8.3–10.1)
CHLORIDE SERPL-SCNC: 107 MMOL/L (ref 100–108)
CO2 SERPL-SCNC: 21 MMOL/L (ref 21–32)
CREAT SERPL-MCNC: 1.18 MG/DL (ref 0.6–1.3)
EOSINOPHIL # BLD AUTO: 0 THOUSAND/ΜL (ref 0–0.61)
EOSINOPHIL NFR BLD AUTO: 0 % (ref 0–6)
ERYTHROCYTE [DISTWIDTH] IN BLOOD BY AUTOMATED COUNT: 12.8 % (ref 11.6–15.1)
GFR SERPL CREATININE-BSD FRML MDRD: 82 ML/MIN/1.73SQ M
GLUCOSE SERPL-MCNC: 108 MG/DL (ref 65–140)
HCT VFR BLD AUTO: 46.1 % (ref 36.5–49.3)
HGB BLD-MCNC: 15.9 G/DL (ref 12–17)
HOLD SPECIMEN: NORMAL
IMM GRANULOCYTES # BLD AUTO: 0.05 THOUSAND/UL (ref 0–0.2)
IMM GRANULOCYTES NFR BLD AUTO: 0 % (ref 0–2)
LIPASE SERPL-CCNC: 50 U/L (ref 73–393)
LYMPHOCYTES # BLD AUTO: 1.09 THOUSANDS/ΜL (ref 0.6–4.47)
LYMPHOCYTES NFR BLD AUTO: 9 % (ref 14–44)
MCH RBC QN AUTO: 28.3 PG (ref 26.8–34.3)
MCHC RBC AUTO-ENTMCNC: 34.5 G/DL (ref 31.4–37.4)
MCV RBC AUTO: 82 FL (ref 82–98)
MONOCYTES # BLD AUTO: 0.39 THOUSAND/ΜL (ref 0.17–1.22)
MONOCYTES NFR BLD AUTO: 3 % (ref 4–12)
NEUTROPHILS # BLD AUTO: 10.34 THOUSANDS/ΜL (ref 1.85–7.62)
NEUTS SEG NFR BLD AUTO: 87 % (ref 43–75)
NRBC BLD AUTO-RTO: 0 /100 WBCS
PLATELET # BLD AUTO: 238 THOUSANDS/UL (ref 149–390)
PMV BLD AUTO: 11.2 FL (ref 8.9–12.7)
POTASSIUM SERPL-SCNC: 3.9 MMOL/L (ref 3.5–5.3)
PROT SERPL-MCNC: 8.6 G/DL (ref 6.4–8.2)
RBC # BLD AUTO: 5.61 MILLION/UL (ref 3.88–5.62)
SODIUM SERPL-SCNC: 143 MMOL/L (ref 136–145)
WBC # BLD AUTO: 11.96 THOUSAND/UL (ref 4.31–10.16)

## 2021-08-26 PROCEDURE — 83690 ASSAY OF LIPASE: CPT | Performed by: EMERGENCY MEDICINE

## 2021-08-26 PROCEDURE — 96375 TX/PRO/DX INJ NEW DRUG ADDON: CPT

## 2021-08-26 PROCEDURE — 80053 COMPREHEN METABOLIC PANEL: CPT | Performed by: EMERGENCY MEDICINE

## 2021-08-26 PROCEDURE — 96361 HYDRATE IV INFUSION ADD-ON: CPT

## 2021-08-26 PROCEDURE — 99284 EMERGENCY DEPT VISIT MOD MDM: CPT | Performed by: EMERGENCY MEDICINE

## 2021-08-26 PROCEDURE — 85025 COMPLETE CBC W/AUTO DIFF WBC: CPT | Performed by: EMERGENCY MEDICINE

## 2021-08-26 PROCEDURE — 93005 ELECTROCARDIOGRAM TRACING: CPT

## 2021-08-26 PROCEDURE — C9113 INJ PANTOPRAZOLE SODIUM, VIA: HCPCS | Performed by: EMERGENCY MEDICINE

## 2021-08-26 PROCEDURE — 96374 THER/PROPH/DIAG INJ IV PUSH: CPT

## 2021-08-26 PROCEDURE — 99284 EMERGENCY DEPT VISIT MOD MDM: CPT

## 2021-08-26 PROCEDURE — 36415 COLL VENOUS BLD VENIPUNCTURE: CPT | Performed by: EMERGENCY MEDICINE

## 2021-08-26 RX ORDER — PANTOPRAZOLE SODIUM 40 MG/1
40 INJECTION, POWDER, FOR SOLUTION INTRAVENOUS ONCE
Status: COMPLETED | OUTPATIENT
Start: 2021-08-26 | End: 2021-08-26

## 2021-08-26 RX ORDER — ONDANSETRON 4 MG/1
4 TABLET, ORALLY DISINTEGRATING ORAL EVERY 6 HOURS PRN
Qty: 12 TABLET | Refills: 0 | Status: SHIPPED | OUTPATIENT
Start: 2021-08-26 | End: 2021-08-27

## 2021-08-26 RX ORDER — DIPHENHYDRAMINE HYDROCHLORIDE 50 MG/ML
12.5 INJECTION INTRAMUSCULAR; INTRAVENOUS ONCE
Status: COMPLETED | OUTPATIENT
Start: 2021-08-26 | End: 2021-08-26

## 2021-08-26 RX ORDER — HYDROMORPHONE HCL/PF 1 MG/ML
0.5 SYRINGE (ML) INJECTION ONCE
Status: COMPLETED | OUTPATIENT
Start: 2021-08-26 | End: 2021-08-26

## 2021-08-26 RX ORDER — SUCRALFATE 1 G/1
1 TABLET ORAL ONCE
Status: DISCONTINUED | OUTPATIENT
Start: 2021-08-26 | End: 2021-08-26 | Stop reason: HOSPADM

## 2021-08-26 RX ORDER — METOCLOPRAMIDE HYDROCHLORIDE 5 MG/ML
10 INJECTION INTRAMUSCULAR; INTRAVENOUS ONCE
Status: COMPLETED | OUTPATIENT
Start: 2021-08-26 | End: 2021-08-26

## 2021-08-26 RX ORDER — ONDANSETRON 2 MG/ML
4 INJECTION INTRAMUSCULAR; INTRAVENOUS ONCE
Status: COMPLETED | OUTPATIENT
Start: 2021-08-26 | End: 2021-08-26

## 2021-08-26 RX ADMIN — METOCLOPRAMIDE HYDROCHLORIDE 10 MG: 5 INJECTION INTRAMUSCULAR; INTRAVENOUS at 19:50

## 2021-08-26 RX ADMIN — DIPHENHYDRAMINE HYDROCHLORIDE 12.5 MG: 50 INJECTION, SOLUTION INTRAMUSCULAR; INTRAVENOUS at 19:46

## 2021-08-26 RX ADMIN — PANTOPRAZOLE SODIUM 40 MG: 40 INJECTION, POWDER, FOR SOLUTION INTRAVENOUS at 19:48

## 2021-08-26 RX ADMIN — HYDROMORPHONE HYDROCHLORIDE 0.5 MG: 1 INJECTION, SOLUTION INTRAMUSCULAR; INTRAVENOUS; SUBCUTANEOUS at 19:54

## 2021-08-26 RX ADMIN — SODIUM CHLORIDE 1000 ML: 0.9 INJECTION, SOLUTION INTRAVENOUS at 19:44

## 2021-08-26 RX ADMIN — ONDANSETRON 4 MG: 2 INJECTION INTRAMUSCULAR; INTRAVENOUS at 21:20

## 2021-08-26 NOTE — TELEPHONE ENCOUNTER
Patients GI provider:  Dr Yoly Montano     Number to return call: [phone]    Reason for call: Pt's mom named Martina Zamora called requesting to speak with someone regarding son who has ulcer pain, vomiting with chills       Scheduled procedure/appointment date if applicable: Apt/procedure 11/3/21

## 2021-08-26 NOTE — ED PROVIDER NOTES
History  Chief Complaint   Patient presents with    Abdominal Pain     pt checked into ed multiple times today, then left after triage  c/o abd pain with hx of ulcers  +fever in triage       History provided by:  Patient and medical records   used: No    Abdominal Pain  Associated symptoms: nausea and vomiting    Associated symptoms: no chest pain, no dysuria, no fatigue, no fever and no shortness of breath    16-year-old male with history of duodenal ulcer presents for evaluation after developing epigastric pain on waking today  States his associated with chills, decreased appetite, vomiting multiple times  Overall he has been having similar symptoms for years but would have flare ups intermittently lasting a week or so  Was recently evaluated by GI  They recommended Protonix, small frequent meals, workup for celiac disease, EGD, gastric emptying study  On exam he appears uncomfortable  Temperature elevated any has some chills  Mild epigastric tenderness without rebound or guarding  No distention  Plan labs, symptomatic treatment, and will re-evaluate  Prior to Admission Medications   Prescriptions Last Dose Informant Patient Reported?  Taking?   famotidine (PEPCID) 20 mg tablet   No No   Sig: Take 1 tablet (20 mg total) by mouth 2 (two) times a day for 3 days   pantoprazole (PROTONIX) 40 mg tablet  Self No No   Sig: TAKE ONE TABLET BY MOUTH TWICE DAILY BEFORE MEALS    promethazine (PHENERGAN) 12 5 MG tablet  Self No No   Sig: Take 1 tablet (12 5 mg total) by mouth every 6 (six) hours as needed for nausea or vomiting   sucralfate (CARAFATE) 1 g tablet   No No   Sig: Take 1 tablet (1 g total) by mouth 4 (four) times a day for 3 days   sucralfate (CARAFATE) 1 g/10 mL suspension   No No   Sig: Take 10 mL (1 g total) by mouth 4 (four) times a day (with meals and at bedtime)      Facility-Administered Medications: None       Past Medical History:   Diagnosis Date    Duodenal ulcer Past Surgical History:   Procedure Laterality Date    EGD      UPPER GASTROINTESTINAL ENDOSCOPY         Family History   Problem Relation Age of Onset    Lung cancer Maternal Grandmother      I have reviewed and agree with the history as documented  E-Cigarette/Vaping    E-Cigarette Use Never User      E-Cigarette/Vaping Substances     Social History     Tobacco Use    Smoking status: Former Smoker     Types: Cigarettes    Smokeless tobacco: Never Used   Vaping Use    Vaping Use: Never used   Substance Use Topics    Alcohol use: Not Currently    Drug use: Not Currently     Comment: Marijuana - stopped 1 year ago       Review of Systems   Constitutional: Negative for activity change, appetite change, fatigue and fever  Respiratory: Negative for chest tightness and shortness of breath  Cardiovascular: Negative for chest pain  Gastrointestinal: Positive for abdominal pain, nausea and vomiting  Negative for blood in stool  Genitourinary: Negative for difficulty urinating and dysuria  Musculoskeletal: Negative for back pain and neck pain  Skin: Negative for color change and rash  Neurological: Negative for dizziness and weakness  All other systems reviewed and are negative  Physical Exam  Physical Exam  Vitals and nursing note reviewed  Constitutional:       Appearance: He is well-developed  Comments: Appears uncomfortable  Somewhat tremulous  HENT:      Head: Normocephalic and atraumatic  Cardiovascular:      Rate and Rhythm: Normal rate and regular rhythm  Pulmonary:      Effort: Pulmonary effort is normal  No respiratory distress  Abdominal:      General: Abdomen is flat  Palpations: Abdomen is soft  Hernia: No hernia is present  Comments: Mild epigastric tenderness without rebound or guarding  Skin:     General: Skin is warm and dry  Neurological:      General: No focal deficit present        Mental Status: He is alert and oriented to person, place, and time  Psychiatric:         Mood and Affect: Mood normal          Behavior: Behavior normal          Vital Signs  ED Triage Vitals [08/26/21 1848]   Temperature Pulse Respirations Blood Pressure SpO2   100 2 °F (37 9 °C) 80 18 115/61 100 %      Temp Source Heart Rate Source Patient Position - Orthostatic VS BP Location FiO2 (%)   Oral Monitor Sitting Right arm --      Pain Score       Worst Possible Pain           Vitals:    08/26/21 1848 08/26/21 2044   BP: 115/61 136/74   Pulse: 80 85   Patient Position - Orthostatic VS: Sitting Lying         Visual Acuity      ED Medications  Medications   sucralfate (CARAFATE) tablet 1 g (1 g Oral Not Given 8/26/21 2123)   sodium chloride 0 9 % bolus 1,000 mL (0 mL Intravenous Stopped 8/26/21 2120)   pantoprazole (PROTONIX) injection 40 mg (40 mg Intravenous Given 8/26/21 1948)   HYDROmorphone (DILAUDID) injection 0 5 mg (0 5 mg Intravenous Given 8/26/21 1954)   metoclopramide (REGLAN) injection 10 mg (10 mg Intravenous Given 8/26/21 1950)   diphenhydrAMINE (BENADRYL) injection 12 5 mg (12 5 mg Intravenous Given 8/26/21 1946)   ondansetron (ZOFRAN) injection 4 mg (4 mg Intravenous Given 8/26/21 2120)       Diagnostic Studies  Results Reviewed     Procedure Component Value Units Date/Time    Dolphin draw [[id number]] Collected: 08/26/21 1935    Lab Status: Final result Specimen: Blood from Arm, Right Updated: 08/26/21 2101    Narrative: The following orders were created for panel order Dolphin draw    Procedure                               Abnormality         Status                     ---------                               -----------         ------                     Lennis Demario Top on ESJQ[[id number]]                           Final result               Gold top on UIYP[[id number]]                                 Final result               Green / Black tube on YRTM[[id number]]                       Final result                 Please view results for these tests on the individual orders      Comprehensive metabolic panel [[id number]]  (Abnormal) Collected: 08/26/21 1935    Lab Status: Final result Specimen: Blood from Arm, Right Updated: 08/26/21 2004     Sodium 143 mmol/L      Potassium 3 9 mmol/L      Chloride 107 mmol/L      CO2 21 mmol/L      ANION GAP 15 mmol/L      BUN 14 mg/dL      Creatinine 1 18 mg/dL      Glucose 108 mg/dL      Calcium 9 8 mg/dL      AST 20 U/L      ALT 36 U/L      Alkaline Phosphatase 84 U/L      Total Protein 8 6 g/dL      Albumin 4 7 g/dL      Total Bilirubin 0 68 mg/dL      eGFR 82 ml/min/1 73sq m     Narrative:      [place] guidelines for Chronic Kidney Disease (CKD):     Stage 1 with normal or high GFR (GFR > 90 mL/min/1 73 square meters)    Stage 2 Mild CKD (GFR = 60-89 mL/min/1 73 square meters)    Stage 3A Moderate CKD (GFR = 45-59 mL/min/1 73 square meters)    Stage 3B Moderate CKD (GFR = 30-44 mL/min/1 73 square meters)    Stage 4 Severe CKD (GFR = 15-29 mL/min/1 73 square meters)    Stage 5 End Stage CKD (GFR <15 mL/min/1 73 square meters)  Note: GFR calculation is accurate only with a steady state creatinine    Lipase [[id number]]  (Abnormal) Collected: 08/26/21 1935    Lab Status: Final result Specimen: Blood from Arm, Right Updated: 08/26/21 1958     Lipase 50 u/L     CBC and differential [[id number]]  (Abnormal) Collected: 08/26/21 1935    Lab Status: Final result Specimen: Blood from Arm, Right Updated: 08/26/21 1943     WBC 11 96 Thousand/uL      RBC 5 61 Million/uL      Hemoglobin 15 9 g/dL      Hematocrit 46 1 %      MCV 82 fL      MCH 28 3 pg      MCHC 34 5 g/dL      RDW 12 8 %      MPV 11 2 fL      Platelets 922 Thousands/uL      nRBC 0 /100 WBCs      Neutrophils Relative 87 %      Immat GRANS % 0 %      Lymphocytes Relative 9 %      Monocytes Relative 3 %      Eosinophils Relative 0 %      Basophils Relative 1 %      Neutrophils Absolute 10 34 Thousands/µL      Immature Grans Absolute 0 05 Thousand/uL      Lymphocytes Absolute 1 09 Thousands/µL      Monocytes Absolute 0 39 Thousand/µL      Eosinophils Absolute 0 00 Thousand/µL      Basophils Absolute 0 09 Thousands/µL                  No orders to display              Procedures  Procedures         ED Course  ED Course as of Aug 26 2149   Thu Aug 26, 2021   2136 Patient reports feeling a bit better  Stable for discharge  Will follow up with GI return to the ED if his symptoms worsen  MDM  Number of Diagnoses or Management Options  Acute epigastric pain: new and requires workup  Diagnosis management comments: 26-year-old male with prior history of duodenal ulcer and frequent bouts of epigastric pain over the last few years presented for evaluation of acute pain this morning  Follows with GI  Most recent EGD attempted earlier this year but he had a food bolus in the stomach  He is scheduled for gastric emptying study this week and for repeat EGD in a few months  Minimal epigastric tenderness on exam   He reported vomiting earlier today but no recurrent vomiting here  Was given Reglan, Zofran, IV Protonix, Dilaudid with some improvement  Feels well and ready for discharge home  Will follow up with GI or return if symptoms worsen  Amount and/or Complexity of Data Reviewed  Clinical lab tests: ordered and reviewed  Review and summarize past medical records: yes    Patient Progress  Patient progress: improved      Disposition  Final diagnoses:   Acute epigastric pain     Time reflects when diagnosis was documented in both MDM as applicable and the Disposition within this note     Time User Action Codes Description Comment    8/26/2021  9:41 PM Suzanne  Add [R10 13] Acute epigastric pain       ED Disposition     ED Disposition Condition Date/Time Comment    Discharge Stable Thu Aug 26, 2021  9:41 PM Cata Jules discharge to home/self care              Follow-up Information     Follow up With Specialties Details Why Contact Info Additional Information    Mai Roberts MD Gastroenterology   [address]  [address] [facility]  [phone]       [facility] Emergency [facility]  If symptoms worsen [address] [facility] [address] [facility] Emergency Department, [address], [place], [place], [address]          Patient's Medications   Discharge Prescriptions    ONDANSETRON (ZOFRAN-ODT) 4 MG DISINTEGRATING TABLET    Take 1 tablet (4 mg total) by mouth every 6 (six) hours as needed for nausea or vomiting       Start Date: 8/26/2021 End Date: --       Order Dose: 4 mg       Quantity: 12 tablet    Refills: 0     No discharge procedures on file      PDMP Review       Value Time User    PDMP Reviewed  Yes 11/1/2020  1:49 AM Mamie Schaumann, MD          ED Provider  Electronically Signed by           Trev Arias MD  08/26/21 0376

## 2021-08-27 LAB
ATRIAL RATE: 74 BPM
P AXIS: 43 DEGREES
PR INTERVAL: 170 MS
QRS AXIS: 52 DEGREES
QRSD INTERVAL: 104 MS
QT INTERVAL: 388 MS
QTC INTERVAL: 430 MS
T WAVE AXIS: 37 DEGREES
VENTRICULAR RATE: 74 BPM

## 2021-08-27 PROCEDURE — 93010 ELECTROCARDIOGRAM REPORT: CPT | Performed by: INTERNAL MEDICINE

## 2021-08-27 RX ORDER — FAMOTIDINE 20 MG/1
20 TABLET, FILM COATED ORAL
Qty: 30 TABLET | Refills: 1 | Status: SHIPPED | OUTPATIENT
Start: 2021-08-27

## 2021-08-27 RX ORDER — PROMETHAZINE HYDROCHLORIDE 12.5 MG/1
12.5 TABLET ORAL EVERY 6 HOURS PRN
Qty: 30 TABLET | Refills: 2 | Status: SHIPPED | OUTPATIENT
Start: 2021-08-27

## 2021-08-27 NOTE — TELEPHONE ENCOUNTER
Called pt with provider recs and informed medications sent to [facility]  Advised to keep EGD and gastric emptying study appt  Advised to call office if continues to have further symptoms, questions, or concerns   Pt verbalized understanding and agreeable to plan

## 2021-08-27 NOTE — TELEPHONE ENCOUNTER
Thank you, I Agree Deyvi, I will refill the phenergan and will order Pepcid at HS as well  Tell him to continue the PPI BID and focus on small frequent meals and not eating close to bedtime  He should get gastric emptying study and EGD as ordered

## 2021-08-27 NOTE — TELEPHONE ENCOUNTER
Office visit 21 Diane Luis Miguelshar Winston   Gastric emptyin21  EGD: 11/3/21  Hx: Duodenal ulcer, food bolus on last EGD, epigastric pain, emesis   ER: 21 +fevers +WBC    Called pt for f/u after ER visit yesterday  Per pt his symptoms were not as bad as last flare up in March  "This AM feeling a little better  Most of the epigastric pain and burning sensation has gone away since medications from the ER  I was able to drink apple juice this morning"  Pt stated he could not keep anything down yesterday and thinks it is from eating too much steak as he has been eating it daily  Pt also thought "flare" could be attributed to eating close before bedtime the day before, as the next morning was vomiting undigested food and had epigastric pain in the night  Denied coffee ground/ blood in emesis or any other appreciating GI symptoms  Reviewed allergy diet with pt as well recommneded f/u with allergist as pt has not done this yet  Also reviewed GERD diet/lifestyle modifications  Regarding medications pt is taking pantoprazole BID and reports taking it correctly   Will refill phenergan prescription as pt prefers this over zofran and consider adding nightly pepcid since most symptoms reported as occurring in AM  Please advise

## 2021-08-28 ENCOUNTER — APPOINTMENT (EMERGENCY)
Dept: CT IMAGING | Facility: HOSPITAL | Age: 31
End: 2021-08-28
Payer: COMMERCIAL

## 2021-08-28 ENCOUNTER — HOSPITAL ENCOUNTER (EMERGENCY)
Facility: HOSPITAL | Age: 31
Discharge: HOME | End: 2021-08-28
Attending: EMERGENCY MEDICINE
Payer: COMMERCIAL

## 2021-08-28 ENCOUNTER — APPOINTMENT (EMERGENCY)
Dept: ULTRASOUND IMAGING | Facility: HOSPITAL | Age: 31
End: 2021-08-28
Payer: COMMERCIAL

## 2021-08-28 VITALS
SYSTOLIC BLOOD PRESSURE: 132 MMHG | BODY MASS INDEX: 26.33 KG/M2 | OXYGEN SATURATION: 100 % | HEIGHT: 71 IN | HEART RATE: 67 BPM | DIASTOLIC BLOOD PRESSURE: 76 MMHG | TEMPERATURE: 99.8 F | RESPIRATION RATE: 16 BRPM | WEIGHT: 188.05 LBS

## 2021-08-28 DIAGNOSIS — R10.30 LOWER ABDOMINAL PAIN: ICD-10-CM

## 2021-08-28 DIAGNOSIS — K52.9 COLITIS: ICD-10-CM

## 2021-08-28 DIAGNOSIS — R11.0 NAUSEA: Primary | ICD-10-CM

## 2021-08-28 DIAGNOSIS — D72.829 LEUKOCYTOSIS: ICD-10-CM

## 2021-08-28 LAB
ALBUMIN SERPL BCP-MCNC: 4.3 G/DL (ref 3.5–5)
ALP SERPL-CCNC: 70 U/L (ref 46–116)
ALT SERPL W P-5'-P-CCNC: 29 U/L (ref 12–78)
ANION GAP SERPL CALCULATED.3IONS-SCNC: 13 MMOL/L (ref 4–13)
APTT PPP: 26 SECONDS (ref 23–37)
AST SERPL W P-5'-P-CCNC: 15 U/L (ref 5–45)
BASOPHILS # BLD AUTO: 0.24 THOUSANDS/ΜL (ref 0–0.1)
BASOPHILS NFR BLD AUTO: 2 % (ref 0–1)
BILIRUB SERPL-MCNC: 1.13 MG/DL (ref 0.2–1)
BILIRUB UR QL STRIP: NEGATIVE
BUN SERPL-MCNC: 16 MG/DL (ref 5–25)
CALCIUM SERPL-MCNC: 9 MG/DL (ref 8.3–10.1)
CHLORIDE SERPL-SCNC: 106 MMOL/L (ref 100–108)
CLARITY UR: CLEAR
CO2 SERPL-SCNC: 23 MMOL/L (ref 21–32)
COLOR UR: YELLOW
CREAT SERPL-MCNC: 1.01 MG/DL (ref 0.6–1.3)
EOSINOPHIL # BLD AUTO: 0.16 THOUSAND/ΜL (ref 0–0.61)
EOSINOPHIL NFR BLD AUTO: 1 % (ref 0–6)
ERYTHROCYTE [DISTWIDTH] IN BLOOD BY AUTOMATED COUNT: 13 % (ref 11.6–15.1)
GFR SERPL CREATININE-BSD FRML MDRD: 99 ML/MIN/1.73SQ M
GLUCOSE SERPL-MCNC: 129 MG/DL (ref 65–140)
GLUCOSE UR STRIP-MCNC: NEGATIVE MG/DL
HCT VFR BLD AUTO: 39.8 % (ref 36.5–49.3)
HGB BLD-MCNC: 13.6 G/DL (ref 12–17)
HGB UR QL STRIP.AUTO: NEGATIVE
IMM GRANULOCYTES # BLD AUTO: 0.05 THOUSAND/UL (ref 0–0.2)
IMM GRANULOCYTES NFR BLD AUTO: 0 % (ref 0–2)
INR PPP: 1.04 (ref 0.84–1.19)
KETONES UR STRIP-MCNC: ABNORMAL MG/DL
LEUKOCYTE ESTERASE UR QL STRIP: NEGATIVE
LIPASE SERPL-CCNC: 62 U/L (ref 73–393)
LYMPHOCYTES # BLD AUTO: 3.22 THOUSANDS/ΜL (ref 0.6–4.47)
LYMPHOCYTES NFR BLD AUTO: 21 % (ref 14–44)
MCH RBC QN AUTO: 28 PG (ref 26.8–34.3)
MCHC RBC AUTO-ENTMCNC: 34.2 G/DL (ref 31.4–37.4)
MCV RBC AUTO: 82 FL (ref 82–98)
MONOCYTES # BLD AUTO: 1.38 THOUSAND/ΜL (ref 0.17–1.22)
MONOCYTES NFR BLD AUTO: 9 % (ref 4–12)
NEUTROPHILS # BLD AUTO: 10.35 THOUSANDS/ΜL (ref 1.85–7.62)
NEUTS SEG NFR BLD AUTO: 67 % (ref 43–75)
NITRITE UR QL STRIP: NEGATIVE
NRBC BLD AUTO-RTO: 0 /100 WBCS
PH UR STRIP.AUTO: 5.5 [PH]
PLATELET # BLD AUTO: 212 THOUSANDS/UL (ref 149–390)
PMV BLD AUTO: 11.5 FL (ref 8.9–12.7)
POTASSIUM SERPL-SCNC: 3.2 MMOL/L (ref 3.5–5.3)
PROCALCITONIN SERPL-MCNC: <0.05 NG/ML
PROT SERPL-MCNC: 7.4 G/DL (ref 6.4–8.2)
PROT UR STRIP-MCNC: NEGATIVE MG/DL
PROTHROMBIN TIME: 13.7 SECONDS (ref 11.6–14.5)
RBC # BLD AUTO: 4.86 MILLION/UL (ref 3.88–5.62)
SARS-COV-2 RNA RESP QL NAA+PROBE: NEGATIVE
SODIUM SERPL-SCNC: 142 MMOL/L (ref 136–145)
SP GR UR STRIP.AUTO: 1.01 (ref 1–1.03)
UROBILINOGEN UR QL STRIP.AUTO: 0.2 E.U./DL
WBC # BLD AUTO: 15.4 THOUSAND/UL (ref 4.31–10.16)

## 2021-08-28 PROCEDURE — 81003 URINALYSIS AUTO W/O SCOPE: CPT | Performed by: PHYSICIAN ASSISTANT

## 2021-08-28 PROCEDURE — 87040 BLOOD CULTURE FOR BACTERIA: CPT | Performed by: PHYSICIAN ASSISTANT

## 2021-08-28 PROCEDURE — C9113 INJ PANTOPRAZOLE SODIUM, VIA: HCPCS | Performed by: PHYSICIAN ASSISTANT

## 2021-08-28 PROCEDURE — 76705 ECHO EXAM OF ABDOMEN: CPT

## 2021-08-28 PROCEDURE — 85610 PROTHROMBIN TIME: CPT | Performed by: PHYSICIAN ASSISTANT

## 2021-08-28 PROCEDURE — U0005 INFEC AGEN DETEC AMPLI PROBE: HCPCS | Performed by: PHYSICIAN ASSISTANT

## 2021-08-28 PROCEDURE — 80053 COMPREHEN METABOLIC PANEL: CPT | Performed by: PHYSICIAN ASSISTANT

## 2021-08-28 PROCEDURE — 85730 THROMBOPLASTIN TIME PARTIAL: CPT | Performed by: PHYSICIAN ASSISTANT

## 2021-08-28 PROCEDURE — 96376 TX/PRO/DX INJ SAME DRUG ADON: CPT

## 2021-08-28 PROCEDURE — 93005 ELECTROCARDIOGRAM TRACING: CPT

## 2021-08-28 PROCEDURE — 85025 COMPLETE CBC W/AUTO DIFF WBC: CPT | Performed by: PHYSICIAN ASSISTANT

## 2021-08-28 PROCEDURE — 96361 HYDRATE IV INFUSION ADD-ON: CPT

## 2021-08-28 PROCEDURE — 96365 THER/PROPH/DIAG IV INF INIT: CPT

## 2021-08-28 PROCEDURE — U0003 INFECTIOUS AGENT DETECTION BY NUCLEIC ACID (DNA OR RNA); SEVERE ACUTE RESPIRATORY SYNDROME CORONAVIRUS 2 (SARS-COV-2) (CORONAVIRUS DISEASE [COVID-19]), AMPLIFIED PROBE TECHNIQUE, MAKING USE OF HIGH THROUGHPUT TECHNOLOGIES AS DESCRIBED BY CMS-2020-01-R: HCPCS | Performed by: PHYSICIAN ASSISTANT

## 2021-08-28 PROCEDURE — 84145 PROCALCITONIN (PCT): CPT | Performed by: PHYSICIAN ASSISTANT

## 2021-08-28 PROCEDURE — 74177 CT ABD & PELVIS W/CONTRAST: CPT

## 2021-08-28 PROCEDURE — 99285 EMERGENCY DEPT VISIT HI MDM: CPT | Performed by: PHYSICIAN ASSISTANT

## 2021-08-28 PROCEDURE — 83690 ASSAY OF LIPASE: CPT | Performed by: PHYSICIAN ASSISTANT

## 2021-08-28 PROCEDURE — 36415 COLL VENOUS BLD VENIPUNCTURE: CPT | Performed by: PHYSICIAN ASSISTANT

## 2021-08-28 PROCEDURE — 99284 EMERGENCY DEPT VISIT MOD MDM: CPT

## 2021-08-28 PROCEDURE — 96366 THER/PROPH/DIAG IV INF ADDON: CPT

## 2021-08-28 PROCEDURE — 96375 TX/PRO/DX INJ NEW DRUG ADDON: CPT

## 2021-08-28 RX ORDER — HYDROMORPHONE HCL/PF 1 MG/ML
1 SYRINGE (ML) INJECTION ONCE
Status: COMPLETED | OUTPATIENT
Start: 2021-08-28 | End: 2021-08-28

## 2021-08-28 RX ORDER — ACETAMINOPHEN 325 MG/1
650 TABLET ORAL ONCE
Status: DISCONTINUED | OUTPATIENT
Start: 2021-08-28 | End: 2021-08-28 | Stop reason: HOSPADM

## 2021-08-28 RX ORDER — ONDANSETRON 2 MG/ML
4 INJECTION INTRAMUSCULAR; INTRAVENOUS ONCE
Status: COMPLETED | OUTPATIENT
Start: 2021-08-28 | End: 2021-08-28

## 2021-08-28 RX ORDER — HYDROMORPHONE HCL/PF 1 MG/ML
0.5 SYRINGE (ML) INJECTION ONCE
Status: COMPLETED | OUTPATIENT
Start: 2021-08-28 | End: 2021-08-28

## 2021-08-28 RX ORDER — MAGNESIUM HYDROXIDE/ALUMINUM HYDROXICE/SIMETHICONE 120; 1200; 1200 MG/30ML; MG/30ML; MG/30ML
30 SUSPENSION ORAL ONCE
Status: DISCONTINUED | OUTPATIENT
Start: 2021-08-28 | End: 2021-08-28 | Stop reason: HOSPADM

## 2021-08-28 RX ORDER — PANTOPRAZOLE SODIUM 40 MG/1
40 INJECTION, POWDER, FOR SOLUTION INTRAVENOUS ONCE
Status: COMPLETED | OUTPATIENT
Start: 2021-08-28 | End: 2021-08-28

## 2021-08-28 RX ORDER — SUCRALFATE 1 G/1
1 TABLET ORAL ONCE
Status: DISCONTINUED | OUTPATIENT
Start: 2021-08-28 | End: 2021-08-28 | Stop reason: HOSPADM

## 2021-08-28 RX ORDER — HYDROMORPHONE HCL/PF 1 MG/ML
0.5 SYRINGE (ML) INJECTION ONCE AS NEEDED
Status: DISCONTINUED | OUTPATIENT
Start: 2021-08-28 | End: 2021-08-28 | Stop reason: HOSPADM

## 2021-08-28 RX ORDER — LIDOCAINE HYDROCHLORIDE 20 MG/ML
15 SOLUTION OROPHARYNGEAL 4 TIMES DAILY PRN
Status: DISCONTINUED | OUTPATIENT
Start: 2021-08-28 | End: 2021-08-28 | Stop reason: HOSPADM

## 2021-08-28 RX ADMIN — IOHEXOL 100 ML: 350 INJECTION, SOLUTION INTRAVENOUS at 07:18

## 2021-08-28 RX ADMIN — SODIUM CHLORIDE 1000 ML: 0.9 INJECTION, SOLUTION INTRAVENOUS at 05:20

## 2021-08-28 RX ADMIN — HYDROMORPHONE HYDROCHLORIDE 0.5 MG: 1 INJECTION, SOLUTION INTRAMUSCULAR; INTRAVENOUS; SUBCUTANEOUS at 05:42

## 2021-08-28 RX ADMIN — SODIUM CHLORIDE 1000 ML: 0.9 INJECTION, SOLUTION INTRAVENOUS at 06:47

## 2021-08-28 RX ADMIN — ONDANSETRON 4 MG: 2 INJECTION INTRAMUSCULAR; INTRAVENOUS at 05:42

## 2021-08-28 RX ADMIN — Medication 1000 MG: at 07:30

## 2021-08-28 RX ADMIN — HYDROMORPHONE HYDROCHLORIDE 1 MG: 1 INJECTION, SOLUTION INTRAMUSCULAR; INTRAVENOUS; SUBCUTANEOUS at 10:42

## 2021-08-28 RX ADMIN — PANTOPRAZOLE SODIUM 40 MG: 40 INJECTION, POWDER, FOR SOLUTION INTRAVENOUS at 05:42

## 2021-08-28 RX ADMIN — HYDROMORPHONE HYDROCHLORIDE 0.5 MG: 1 INJECTION, SOLUTION INTRAMUSCULAR; INTRAVENOUS; SUBCUTANEOUS at 06:51

## 2021-08-28 NOTE — ED CARE HANDOFF
Emergency Department Sign Out Note        Sign out and transfer of care from [facility]  See Separate Emergency Department note  The patient, Deo Madison, was evaluated by the previous provider for abdominal pain with N/V  Workup Completed:  H&P, labs, CT abdomen and pelvis    ED Course / Workup Pending (followup):  US and disp pending results  Likely inpatient for abdominal pain vs surgical evaluation                                   ED Course as of Aug 28 1113   Sat Aug 28, 2021   1023 Discussed results of ultrasound patient and mother at bedside  Offered admission for pain control  Patient states that he is more comfortable at home  Feels that he will be able to be discharged after additional dose of pain medications in the emergency department  Additionally discussed with the temperature which patient states is normal for him when he gets this pain  Has been compliant with his Protonix, Reglan, and Carafate  Procedures  MDM    Disposition  Final diagnoses:   Nausea   Lower abdominal pain   Colitis   Leukocytosis     Time reflects when diagnosis was documented in both MDM as applicable and the Disposition within this note     Time User Action Codes Description Comment    8/28/2021  8:32 AM Gerianne Dyllan Add [R11 0] Nausea     8/28/2021  8:33 AM Gerianne Dyllan Add [R10 30] Lower abdominal pain     8/28/2021  8:33 AM Gerianne Dyllan Add [K52 9] Colitis     8/28/2021  8:33 AM Gerianne Dyllan Add [M95 596] Leukocytosis       ED Disposition     ED Disposition Condition Date/Time Comment    Discharge Stable Sat Aug 28, 2021 10:24 AM Faye Jules discharge to home/self care              Follow-up Information     Follow up With Specialties Details Why Contact Info    Meli Arias DO Family Medicine Schedule an appointment as soon as possible for a visit   [address] [place] [address] [address] [facility]      Eduarda Nielson MD Gastroenterology Schedule an appointment as soon as possible for a visit   [place] [address]  [place] [address]  [phone]          Discharge Medication List as of 8/28/2021 10:24 AM      CONTINUE these medications which have NOT CHANGED    Details   famotidine (PEPCID) 20 mg tablet Take 1 tablet (20 mg total) by mouth daily at bedtime, Starting Fri 8/27/2021, Normal      pantoprazole (PROTONIX) 40 mg tablet TAKE ONE TABLET BY MOUTH TWICE DAILY BEFORE MEALS , Normal      promethazine (PHENERGAN) 12 5 MG tablet Take 1 tablet (12 5 mg total) by mouth every 6 (six) hours as needed for nausea or vomiting, Starting Fri 8/27/2021, Normal      sucralfate (CARAFATE) 1 g tablet Take 1 tablet (1 g total) by mouth 4 (four) times a day for 3 days, Starting Wed 3/3/2021, Until Sat 3/6/2021, Normal      sucralfate (CARAFATE) 1 g/10 mL suspension Take 10 mL (1 g total) by mouth 4 (four) times a day (with meals and at bedtime), Starting Fri 10/30/2020, Until Sun 11/29/2020, Normal           No discharge procedures on file         ED Provider  Electronically Signed by     Benson Gutierrez PA-C  08/28/21 2807

## 2021-08-28 NOTE — ED PROVIDER NOTES
History  Chief Complaint   Patient presents with    Abdominal Pain     c/o abdominal pain, N/V x 1 hr  Pt seen in ER 2 days ago for similar symptoms  Pt stated "my ulcers are flaring up again"  Increased anxiousness     Patient is a 28year old male with history of duodenal ulcer in no significant past surgical history that presents emergency department with nausea vomiting symptoms for 1 hour  Patient has associated symptomatology of sharp shooting nonradiating central umbilical pain beginning the current ED presentation of nausea vomiting symptoms  Patient was recently seen in the emergency department on 08/26/2021 for symptoms similar to current with recent evaluation of GI with Protonix recommendation, small meals, EGD, gastric emptying study and celiac disease workup  Patient has an egg, peanut, and milk allergy panel abnormalities on 08/02/2021  Negative celiac disease antibody profile on 08/02/2021  With patient called by GI specialist and patient made aware of aforementioned results; 08/02/2021  Patient denies questionable dietary item intake at this time  Patient denies palliative factors with provocative factors of pressure to umbilicus  Patient denies not effective treatment  Patient denies fevers, chills, diarrhea, constipation, and urinary symptoms  Patient denies recent fall or recent trauma  Patient sick contacts recent travel  Patient denies chest pain and shortness of breath  History provided by:  Patient   used: No        Prior to Admission Medications   Prescriptions Last Dose Informant Patient Reported?  Taking?   famotidine (PEPCID) 20 mg tablet   No No   Sig: Take 1 tablet (20 mg total) by mouth daily at bedtime   pantoprazole (PROTONIX) 40 mg tablet  Self No No   Sig: TAKE ONE TABLET BY MOUTH TWICE DAILY BEFORE MEALS    promethazine (PHENERGAN) 12 5 MG tablet   No No   Sig: Take 1 tablet (12 5 mg total) by mouth every 6 (six) hours as needed for nausea or vomiting   sucralfate (CARAFATE) 1 g tablet   No No   Sig: Take 1 tablet (1 g total) by mouth 4 (four) times a day for 3 days   sucralfate (CARAFATE) 1 g/10 mL suspension   No No   Sig: Take 10 mL (1 g total) by mouth 4 (four) times a day (with meals and at bedtime)      Facility-Administered Medications: None       Past Medical History:   Diagnosis Date    Duodenal ulcer        Past Surgical History:   Procedure Laterality Date    EGD      UPPER GASTROINTESTINAL ENDOSCOPY         Family History   Problem Relation Age of Onset    Lung cancer Maternal Grandmother      I have reviewed and agree with the history as documented  E-Cigarette/Vaping    E-Cigarette Use Never User      E-Cigarette/Vaping Substances     Social History     Tobacco Use    Smoking status: Former Smoker     Types: Cigarettes    Smokeless tobacco: Never Used   Vaping Use    Vaping Use: Never used   Substance Use Topics    Alcohol use: Not Currently    Drug use: Not Currently     Comment: Marijuana - stopped 1 year ago       Review of Systems   Constitutional: Negative for activity change, appetite change, chills and fever  HENT: Negative for congestion, postnasal drip, rhinorrhea, sinus pressure, sinus pain, sore throat and tinnitus  Eyes: Negative for photophobia and visual disturbance  Respiratory: Negative for cough, chest tightness and shortness of breath  Cardiovascular: Negative for chest pain and palpitations  Gastrointestinal: Positive for abdominal pain, nausea and vomiting  Negative for constipation and diarrhea  Genitourinary: Negative for difficulty urinating, dysuria, flank pain, frequency and urgency  Musculoskeletal: Negative for back pain, gait problem, neck pain and neck stiffness  Skin: Negative for pallor and rash  Allergic/Immunologic: Negative for environmental allergies and food allergies  Neurological: Negative for dizziness, weakness, numbness and headaches     Psychiatric/Behavioral: Negative for confusion  All other systems reviewed and are negative  Physical Exam  Physical Exam  Vitals and nursing note reviewed  Constitutional:       General: He is awake  Appearance: Normal appearance  He is well-developed  He is not ill-appearing, toxic-appearing or diaphoretic  Comments: /68 (BP Location: Left arm)   Pulse (!) 54   Temp 100 3 °F (37 9 °C) (Oral)   Resp (!) 34 Comment: hyperventalating  Ht 5' 11" (1 803 m)   Wt 85 3 kg (188 lb 0 8 oz)   SpO2 98%   BMI 26 23 kg/m²      HENT:      Head: Normocephalic and atraumatic  Right Ear: Hearing and external ear normal  No decreased hearing noted  No drainage, swelling or tenderness  No mastoid tenderness  Left Ear: Hearing and external ear normal  No decreased hearing noted  No drainage, swelling or tenderness  No mastoid tenderness  Nose: Nose normal       Mouth/Throat:      Lips: Pink  Mouth: Mucous membranes are moist       Pharynx: Oropharynx is clear  Uvula midline  Eyes:      General: Lids are normal  Vision grossly intact  Right eye: No discharge  Left eye: No discharge  Extraocular Movements: Extraocular movements intact  Conjunctiva/sclera: Conjunctivae normal       Pupils: Pupils are equal, round, and reactive to light  Neck:      Vascular: No JVD  Trachea: Trachea and phonation normal  No tracheal tenderness or tracheal deviation  Cardiovascular:      Rate and Rhythm: Normal rate and regular rhythm  Pulses: Normal pulses  Radial pulses are 2+ on the right side and 2+ on the left side  Posterior tibial pulses are 2+ on the right side and 2+ on the left side  Heart sounds: Normal heart sounds  Pulmonary:      Effort: Pulmonary effort is normal       Breath sounds: Normal breath sounds  No stridor  No decreased breath sounds, wheezing, rhonchi or rales  Chest:      Chest wall: No tenderness     Abdominal:      General: Abdomen is flat  Bowel sounds are normal  There is no distension  Palpations: Abdomen is soft  Abdomen is not rigid  Tenderness: There is abdominal tenderness in the periumbilical area  There is no right CVA tenderness, left CVA tenderness, guarding or rebound  Musculoskeletal:         General: Normal range of motion  Cervical back: Full passive range of motion without pain, normal range of motion and neck supple  No rigidity  No spinous process tenderness or muscular tenderness  Normal range of motion  Lymphadenopathy:      Head:      Right side of head: No submental, submandibular, tonsillar, preauricular, posterior auricular or occipital adenopathy  Left side of head: No submental, submandibular, tonsillar, preauricular, posterior auricular or occipital adenopathy  Cervical: No cervical adenopathy  Right cervical: No superficial, deep or posterior cervical adenopathy  Left cervical: No superficial, deep or posterior cervical adenopathy  Skin:     General: Skin is warm  Capillary Refill: Capillary refill takes less than 2 seconds  Neurological:      General: No focal deficit present  Mental Status: He is alert and oriented to person, place, and time  GCS: GCS eye subscore is 4  GCS verbal subscore is 5  GCS motor subscore is 6  Sensory: No sensory deficit  Deep Tendon Reflexes: Reflexes are normal and symmetric  Reflex Scores:       Patellar reflexes are 2+ on the right side and 2+ on the left side  Psychiatric:         Mood and Affect: Mood normal          Speech: Speech normal          Behavior: Behavior normal  Behavior is cooperative  Thought Content:  Thought content normal          Judgment: Judgment normal          Vital Signs  ED Triage Vitals [08/28/21 0500]   Temperature Pulse Respirations Blood Pressure SpO2   100 3 °F (37 9 °C) (!) 54 (!) 34 138/68 98 %      Temp Source Heart Rate Source Patient Position - Orthostatic VS BP Location FiO2 (%)   Oral Monitor Sitting Left arm --      Pain Score       Worst Possible Pain           Vitals:    08/28/21 0500 08/28/21 0545 08/28/21 0731   BP: 138/68 134/80 132/76   Pulse: (!) 54 62 67   Patient Position - Orthostatic VS: Sitting Lying Sitting         Visual Acuity      ED Medications  Medications   aluminum-magnesium hydroxide-simethicone (MYLANTA) oral suspension 30 mL (30 mL Oral Not Given 8/28/21 0542)   Lidocaine Viscous HCl (XYLOCAINE) 2 % mucosal solution 15 mL (has no administration in time range)   acetaminophen (TYLENOL) tablet 650 mg (650 mg Oral Not Given 8/28/21 0653)   ceftriaxone (ROCEPHIN) 1 g/50 mL in dextrose IVPB (has no administration in time range)   sucralfate (CARAFATE) tablet 1 g (1 g Oral Not Given 8/28/21 0654)   metroNIDAZOLE (FLAGYL) IVPB (premix) 500 mg 100 mL (has no administration in time range)   HYDROmorphone (DILAUDID) injection 0 5 mg (has no administration in time range)   sodium chloride 0 9 % bolus 1,000 mL (0 mL Intravenous Stopped 8/28/21 0646)   ondansetron (ZOFRAN) injection 4 mg (4 mg Intravenous Given 8/28/21 0542)   pantoprazole (PROTONIX) injection 40 mg (40 mg Intravenous Given 8/28/21 0542)   HYDROmorphone (DILAUDID) injection 0 5 mg (0 5 mg Intravenous Given 8/28/21 0542)   sodium chloride 0 9 % bolus 1,000 mL (1,000 mL Intravenous New Bag 8/28/21 0647)   HYDROmorphone (DILAUDID) injection 0 5 mg (0 5 mg Intravenous Given 8/28/21 0651)   iohexol (OMNIPAQUE) 350 MG/ML injection (SINGLE-DOSE) 100 mL (100 mL Intravenous Given 8/28/21 0718)       Diagnostic Studies  Results Reviewed     Procedure Component Value Units Date/Time    UA w Reflex to Microscopic w Reflex to Culture [[id number]]  (Abnormal) Collected: 08/28/21 0736    Lab Status: Final result Specimen: Urine, Clean Catch Updated: 08/28/21 0819     Color, UA Yellow     Clarity, UA Clear     Specific Gravity, UA 1 015     pH, UA 5 5     Leukocytes, UA Negative     Nitrite, UA Negative     Protein, UA Negative mg/dl      Glucose, UA Negative mg/dl      Ketones, UA 15 (1+) mg/dl      Urobilinogen, UA 0 2 E U /dl      Bilirubin, UA Negative     Blood, UA Negative    Novel Coronavirus (Covid-19),PCR [facility] - 2 Hour Stat [[id number]]  (Normal) Collected: 08/28/21 0646    Lab Status: Final result Specimen: Nasopharyngeal from Nose Updated: 08/28/21 0807     SARS-CoV-2 Negative    Narrative: The specimen collection materials, transport medium, and/or testing methodology utilized in the production of these test results have been proven to be reliable in a limited validation with an abbreviated program under the Emergency Utilization Authorization provided by the FDA  Testing reported as "Presumptive positive" will be confirmed with secondary testing to ensure result accuracy  Clinical caution and judgement should be used with the interpretation of these results with consideration of the clinical impression and other laboratory testing  Testing reported as "Positive" or "Negative" has been proven to be accurate according to standard laboratory validation requirements  All testing is performed with control materials showing appropriate reactivity at standard intervals  Procalcitonin with AM Reflex [[id number]] Collected: 08/28/21 0652    Lab Status: In process Specimen: Blood from Arm, Left Updated: 08/28/21 0712    Blood culture #1 [[id number]] Collected: 08/28/21 0652    Lab Status: In process Specimen: Blood from Arm, Left Updated: 08/28/21 0655    Blood culture #2 [[id number]] Collected: 08/28/21 0645    Lab Status:  In process Specimen: Blood from Arm, Right Updated: 08/28/21 0655    Comprehensive metabolic panel [[id number]]  (Abnormal) Collected: 08/28/21 0518    Lab Status: Final result Specimen: Blood from Arm, Right Updated: 08/28/21 0606     Sodium 142 mmol/L      Potassium 3 2 mmol/L      Chloride 106 mmol/L      CO2 23 mmol/L      ANION GAP 13 mmol/L      BUN 16 mg/dL      Creatinine 1 01 mg/dL Glucose 129 mg/dL      Calcium 9 0 mg/dL      AST 15 U/L      ALT 29 U/L      Alkaline Phosphatase 70 U/L      Total Protein 7 4 g/dL      Albumin 4 3 g/dL      Total Bilirubin 1 13 mg/dL      eGFR 99 ml/min/1 73sq m     Narrative:      [place] guidelines for Chronic Kidney Disease (CKD):     Stage 1 with normal or high GFR (GFR > 90 mL/min/1 73 square meters)    Stage 2 Mild CKD (GFR = 60-89 mL/min/1 73 square meters)    Stage 3A Moderate CKD (GFR = 45-59 mL/min/1 73 square meters)    Stage 3B Moderate CKD (GFR = 30-44 mL/min/1 73 square meters)    Stage 4 Severe CKD (GFR = 15-29 mL/min/1 73 square meters)    Stage 5 End Stage CKD (GFR <15 mL/min/1 73 square meters)  Note: GFR calculation is accurate only with a steady state creatinine    Lipase [[id number]]  (Abnormal) Collected: 08/28/21 0518    Lab Status: Final result Specimen: Blood from Arm, Right Updated: 08/28/21 0600     Lipase 62 u/L     Protime-INR [[id number]]  (Normal) Collected: 08/28/21 0518    Lab Status: Final result Specimen: Blood from Arm, Right Updated: 08/28/21 0554     Protime 13 7 seconds      INR 1 04    APTT [[id number]]  (Normal) Collected: 08/28/21 0518    Lab Status: Final result Specimen: Blood from Arm, Right Updated: 08/28/21 0554     PTT 26 seconds     CBC and differential [[id number]]  (Abnormal) Collected: 08/28/21 0518    Lab Status: Final result Specimen: Blood from Arm, Right Updated: 08/28/21 0534     WBC 15 40 Thousand/uL      RBC 4 86 Million/uL      Hemoglobin 13 6 g/dL      Hematocrit 39 8 %      MCV 82 fL      MCH 28 0 pg      MCHC 34 2 g/dL      RDW 13 0 %      MPV 11 5 fL      Platelets 478 Thousands/uL      nRBC 0 /100 WBCs      Neutrophils Relative 67 %      Immat GRANS % 0 %      Lymphocytes Relative 21 %      Monocytes Relative 9 %      Eosinophils Relative 1 %      Basophils Relative 2 %      Neutrophils Absolute 10 35 Thousands/µL      Immature Grans Absolute 0 05 Thousand/uL Lymphocytes Absolute 3 22 Thousands/µL      Monocytes Absolute 1 38 Thousand/µL      Eosinophils Absolute 0 16 Thousand/µL      Basophils Absolute 0 24 Thousands/µL                  CT abdomen pelvis with contrast   ED Interpretation by Jose Oliveros PA-C (08/28 0743)   Patrica ZuritaDO  [phone] 8/28/2021 STAT    Narrative & Impression  CT ABDOMEN AND PELVIS WITH IV CONTRAST     INDICATION:   Umbilical pain        Abdominal pain  c/o abdominal pain, N/V x 1 hr  Pt seen in ER 2 days ago for similar symptoms  Pt stated "my ulcers are flaring up again"  Increased anxiousness     28year old male with history of duodenal ulcer in no significant past surgical history that presents emergency department with nausea vomiting symptoms for 1 hour  Patient has associated symptomatology of sharp shooting nonradiating central umbilical   pain beginning the current ED presentation of nausea vomiting symptoms  Patient was recently seen in the emergency department on 08/26/2021 for symptoms similar to current with recent evaluation of GI with Protonix recommendation, small meals, EGD,   gastric emptying study and celiac disease workup  Patient has an egg, peanut, and milk allergy panel abnormalities on 08/02/2021  Negative celiac disease antibody profile    on 08/02/2021  With patient called by GI specialist and patient made aware of   aforementioned results; 08/02/2021  Patient denies questionable dietary item intake at this time  Patient denies palliative factors with provocative factors of pressure to umbilicus  Patient denies not effective treatment  Patient denies fevers,   chills, diarrhea, constipation, and urinary symptoms  Patient denies recent fall or recent trauma  Patient sick contacts recent travel    Patient denies chest pain and shortness of breath      Entire patient history was obtained directly (via copy and paste) from the ED provider notes in Epic at the time of dictation         Patient has suspected COVID-19      Patient had aborted EGD May 17, 2021 due to food bolus in the stomach            COMPARISON:  Multiple prior studies, most recent of which is a right upper quadrant ultrasound April 11, 2021 and CT abdomen pelvis March 3, 2021      TECHNIQUE:  CT examination of the abdomen and pelvis was performed  Ax   ial, sagittal, and coronal 2D reformatted images were created from the source data and submitted for interpretation      Radiation dose length product (DLP) for this visit:  345 mGy-cm   This examination, like all CT scans performed in the [facility], was performed utilizing techniques to minimize radiation dose exposure, including the use of iterative   reconstruction and automated exposure control      IV Contrast:  100 mL of iohexol (OMNIPAQUE)  Enteric Contrast:  Enteric contrast was not administered               FINDINGS:  ABDOMEN  LOWER CHEST:  No clinically significant abnormality identified in the visualized lower chest            LIVER/BILIARY TREE:  Liver is mildly enlarged  Mild fatty infiltrative changes  Mild nonspecific periportal edema         GALLBLADDER:  Incompletely distended, despite the fasting state of the patient  No gallbladder calculi are seen            SPLEEN:  Unremarkable         PANCREAS:  Unremarkable         ADRENAL GLANDS:  Unrema   rkable         KIDNEYS/URETERS:  Mild heterogeneous enhancement  No renal calyceal or ureteric calculi  No hydronephrosis or hydroureter            STOMACH AND BOWEL:    Evaluation of the GI tract is limited due to lack of oral contrast material      The stomach is incompletely distended with air  The stomach is inadequately evaluated  Hiatal hernia      The proximal small bowel is decompressed  Fecalization of distal small bowel contents, compatible with delayed small bowel transit      The cecum, ascending colon and proximal transverse colon are incompletely distended with feces    Distally, the colon is relatively decompressed and inadequately evaluated           APPENDIX: The appendix is not clearly identified  No findings to suggest appendicitis           ABDOMINOPELVIC CAVITY:  No ascites  No pneumoperitoneum  No lymphadenopathy         VESSELS:  Unremarkable for patient's age               PELVIS  REPRODUCTIVE ORGANS:  Unremarkable for patient's age         URINARY BLAD   RAMA:  Unremarkable            ABDOMINAL WALL/INGUINAL REGIONS:  Unremarkable         OSSEOUS STRUCTURES:  No acute fracture or destructive osseous lesion  Spinal degenerative changes are noted  Degenerative endplate changes are present diffusely throughout the lumbar spine  Scattered Schmorl's nodes  Large central calcified disc   herniation L4-L5, resulting in moderate central stenosis              IMPRESSION:  1  Mild abnormal appearance of the gallbladder  Correlate for biliary dyskinesia  Consider follow-up outpatient nuclear medicine HIDA scan with gallbladder ejection fraction      2   Mild abnormal appearance of the colon  Although the findings may be due to under distention, mild colitis, either infectious or inflammatory, not excluded  Recommend follow-up GI consultation      3   Mild heterogeneous enhancement of the kidneys  Although the findings may be related to phase of contrast administration, correlate for urinary tract infection  Recommend follow-up urinaly   sis      4  Degenerative changes lumbar spine, most pronounced L4-L5  Moderate central stenosis at this level            The study was marked in [facility] for immediate notification            Workstation performed: [id number]           Final Result by Raiza Paz DO (08/28 5021)   1  Mild abnormal appearance of the gallbladder  Correlate for biliary dyskinesia  Consider follow-up outpatient nuclear medicine HIDA scan with gallbladder ejection fraction  2   Mild abnormal appearance of the colon    Although the findings may be due to under distention, mild colitis, either infectious or inflammatory, not excluded  Recommend follow-up GI consultation  3   Mild heterogeneous enhancement of the kidneys  Although the findings may be related to phase of contrast administration, correlate for urinary tract infection  Recommend follow-up urinalysis  4   Degenerative changes lumbar spine, most pronounced L4-L5  Moderate central stenosis at this level  The study was marked in [facility] for immediate notification              Workstation performed: [id number]         US gallbladder    (Results Pending)              Procedures  ECG 12 Lead Documentation Only    Date/Time: 8/28/2021 5:42 AM  Performed by: Kirti Goldberg PA-C  Authorized by: Kirti Goldberg PA-C     Indications / Diagnosis:  Nausea vomiting abdominal pain  ECG reviewed by me, the ED Provider: yes    Patient location:  ED  Previous ECG:     Previous ECG:  Unavailable    Comparison to cardiac monitor: Yes    Interpretation:     Interpretation: non-specific    Rate:     ECG rate:  52    ECG rate assessment: bradycardic    Rhythm:     Rhythm: sinus bradycardia    Ectopy:     Ectopy: none    QRS:     QRS axis:  Normal    QRS intervals:  Normal  Conduction:     Conduction: normal    ST segments:     ST segments:  Normal  T waves:     T waves: normal               ED Course  ED Course as of Aug 28 0837   Sat Aug 28, 2021   0541 WBC(!): 15 40   0811 SARS-COV-2: Negative                                           MDM  Number of Diagnoses or Management Options  Colitis: new and requires workup  Leukocytosis: new and requires workup  Lower abdominal pain: new and requires workup  Nausea: new and requires workup     Amount and/or Complexity of Data Reviewed  Clinical lab tests: ordered and reviewed  Tests in the radiology section of CPT®: ordered and reviewed  Review and summarize past medical records: yes    Risk of Complications, Morbidity, and/or Mortality  Presenting problems: moderate  Diagnostic procedures: moderate    Patient Progress  Patient progress: stable     Patient is a 28year old male with history of duodenal ulcer in no significant past surgical history that presents emergency department with nausea vomiting symptoms for 1 hour  Patient has associated symptomatology of sharp shooting nonradiating central umbilical pain beginning the current ED presentation of nausea vomiting symptoms  Patient hemodynamically stable and afebrile  No sirs  Patient initial oral temperature 100 3° F with subsequent measuring up temperatures before antipyretics were delivered of 99 7° F and 99 8° F  Blood cultures and procalcitonin in process  Slight hypokalemia; ECG with sinus bradycardia with no ischemic changes  Leukocytosis with no banding; no SIRS  Unremarkable lipase with acute pancreatitis not likely  Delivered Carafate, Maalox and viscous lidocaine, Protonix, Zofran, Dilaudid with patient verbalized decrease in abdominal pain symptoms status post aforementioned medication delivery  CT abdomen pelvis with contrast-impression-1  Mild abnormal appearance of the gallbladder  Correlate for biliary dyskinesia  Consider follow-up outpatient nuclear medicine HIDA scan with gallbladder ejection fraction      2   Mild abnormal appearance of the colon  Although the findings may be due to under distention, mild colitis, either infectious or inflammatory, not excluded  Recommend follow-up GI consultation      3   Mild heterogeneous enhancement of the kidneys  Although the findings may be related to phase of contrast administration, correlate for urinary tract infection  Recommend follow-up urinalysis      4  Degenerative changes lumbar spine, most pronounced L4-L5  Moderate central stenosis at this level    Ordered ultrasound to rule out acute cholecystitis  Sign-out to Emmy Choudhury PA-C   Patient with verbal understanding all clinical laboratory imaging findings at time of my re-evaluation and sign-out  Patient abdomen still with pain with palpation; additional p r n  Dilaudid 0 5 mg ordered      Disposition  Final diagnoses:   Nausea   Lower abdominal pain   Colitis   Leukocytosis     Time reflects when diagnosis was documented in both MDM as applicable and the Disposition within this note     Time User Action Codes Description Comment    8/28/2021  8:32 AM Sherra Reaper Add [R11 0] Nausea     8/28/2021  8:33 AM Sherra Reaper Add [R10 30] Lower abdominal pain     8/28/2021  8:33 AM Sherra Reaper Add [K52 9] Colitis     8/28/2021  8:33 AM Sherra Reaper Add [D72 829] Leukocytosis       ED Disposition     None      Follow-up Information    None         Patient's Medications   Discharge Prescriptions    No medications on file     No discharge procedures on file      PDMP Review       Value Time User    PDMP Reviewed  Yes 8/28/2021  8:32 AM Chidi Gilbert PA-C          ED Provider  Electronically Signed by           Chidi Gilbert PA-C  08/28/21 8232

## 2021-08-30 ENCOUNTER — HOSPITAL ENCOUNTER (EMERGENCY)
Facility: HOSPITAL | Age: 31
Discharge: HOME | End: 2021-08-30
Attending: EMERGENCY MEDICINE | Admitting: EMERGENCY MEDICINE
Payer: COMMERCIAL

## 2021-08-30 VITALS
DIASTOLIC BLOOD PRESSURE: 75 MMHG | TEMPERATURE: 98.9 F | RESPIRATION RATE: 18 BRPM | OXYGEN SATURATION: 98 % | SYSTOLIC BLOOD PRESSURE: 137 MMHG | HEART RATE: 56 BPM

## 2021-08-30 DIAGNOSIS — R10.13 EPIGASTRIC PAIN: ICD-10-CM

## 2021-08-30 DIAGNOSIS — R11.2 NAUSEA AND VOMITING: Primary | ICD-10-CM

## 2021-08-30 LAB
ALBUMIN SERPL BCP-MCNC: 4.3 G/DL (ref 3.5–5)
ALP SERPL-CCNC: 74 U/L (ref 46–116)
ALT SERPL W P-5'-P-CCNC: 28 U/L (ref 12–78)
ANION GAP SERPL CALCULATED.3IONS-SCNC: 13 MMOL/L (ref 4–13)
AST SERPL W P-5'-P-CCNC: 28 U/L (ref 5–45)
BASOPHILS # BLD AUTO: 0.14 THOUSANDS/ΜL (ref 0–0.1)
BASOPHILS NFR BLD AUTO: 1 % (ref 0–1)
BILIRUB SERPL-MCNC: 1.15 MG/DL (ref 0.2–1)
BUN SERPL-MCNC: 14 MG/DL (ref 5–25)
CALCIUM SERPL-MCNC: 9.2 MG/DL (ref 8.3–10.1)
CHLORIDE SERPL-SCNC: 101 MMOL/L (ref 100–108)
CO2 SERPL-SCNC: 24 MMOL/L (ref 21–32)
CREAT SERPL-MCNC: 0.81 MG/DL (ref 0.6–1.3)
EOSINOPHIL # BLD AUTO: 0.02 THOUSAND/ΜL (ref 0–0.61)
EOSINOPHIL NFR BLD AUTO: 0 % (ref 0–6)
ERYTHROCYTE [DISTWIDTH] IN BLOOD BY AUTOMATED COUNT: 12.5 % (ref 11.6–15.1)
GFR SERPL CREATININE-BSD FRML MDRD: 119 ML/MIN/1.73SQ M
GLUCOSE SERPL-MCNC: 100 MG/DL (ref 65–140)
HCT VFR BLD AUTO: 43.3 % (ref 36.5–49.3)
HGB BLD-MCNC: 14.8 G/DL (ref 12–17)
IMM GRANULOCYTES # BLD AUTO: 0.09 THOUSAND/UL (ref 0–0.2)
IMM GRANULOCYTES NFR BLD AUTO: 1 % (ref 0–2)
LIPASE SERPL-CCNC: 71 U/L (ref 73–393)
LYMPHOCYTES # BLD AUTO: 3.11 THOUSANDS/ΜL (ref 0.6–4.47)
LYMPHOCYTES NFR BLD AUTO: 23 % (ref 14–44)
MCH RBC QN AUTO: 27.9 PG (ref 26.8–34.3)
MCHC RBC AUTO-ENTMCNC: 34.2 G/DL (ref 31.4–37.4)
MCV RBC AUTO: 82 FL (ref 82–98)
MONOCYTES # BLD AUTO: 1.36 THOUSAND/ΜL (ref 0.17–1.22)
MONOCYTES NFR BLD AUTO: 10 % (ref 4–12)
NEUTROPHILS # BLD AUTO: 8.97 THOUSANDS/ΜL (ref 1.85–7.62)
NEUTS SEG NFR BLD AUTO: 65 % (ref 43–75)
NRBC BLD AUTO-RTO: 0 /100 WBCS
PLATELET # BLD AUTO: 223 THOUSANDS/UL (ref 149–390)
PMV BLD AUTO: 12 FL (ref 8.9–12.7)
POTASSIUM SERPL-SCNC: 3.6 MMOL/L (ref 3.5–5.3)
PROT SERPL-MCNC: 8 G/DL (ref 6.4–8.2)
RBC # BLD AUTO: 5.31 MILLION/UL (ref 3.88–5.62)
SODIUM SERPL-SCNC: 138 MMOL/L (ref 136–145)
WBC # BLD AUTO: 13.69 THOUSAND/UL (ref 4.31–10.16)

## 2021-08-30 PROCEDURE — 83690 ASSAY OF LIPASE: CPT | Performed by: EMERGENCY MEDICINE

## 2021-08-30 PROCEDURE — 99283 EMERGENCY DEPT VISIT LOW MDM: CPT

## 2021-08-30 PROCEDURE — 85025 COMPLETE CBC W/AUTO DIFF WBC: CPT | Performed by: EMERGENCY MEDICINE

## 2021-08-30 PROCEDURE — 96361 HYDRATE IV INFUSION ADD-ON: CPT

## 2021-08-30 PROCEDURE — 99285 EMERGENCY DEPT VISIT HI MDM: CPT | Performed by: EMERGENCY MEDICINE

## 2021-08-30 PROCEDURE — 96374 THER/PROPH/DIAG INJ IV PUSH: CPT

## 2021-08-30 PROCEDURE — 96372 THER/PROPH/DIAG INJ SC/IM: CPT

## 2021-08-30 PROCEDURE — 80053 COMPREHEN METABOLIC PANEL: CPT | Performed by: EMERGENCY MEDICINE

## 2021-08-30 PROCEDURE — 36415 COLL VENOUS BLD VENIPUNCTURE: CPT | Performed by: EMERGENCY MEDICINE

## 2021-08-30 RX ORDER — SODIUM CHLORIDE, SODIUM LACTATE, POTASSIUM CHLORIDE, CALCIUM CHLORIDE 600; 310; 30; 20 MG/100ML; MG/100ML; MG/100ML; MG/100ML
1000 INJECTION, SOLUTION INTRAVENOUS CONTINUOUS
Status: DISCONTINUED | OUTPATIENT
Start: 2021-08-30 | End: 2021-08-30 | Stop reason: HOSPADM

## 2021-08-30 RX ORDER — HALOPERIDOL 5 MG/ML
5 INJECTION INTRAMUSCULAR ONCE
Status: COMPLETED | OUTPATIENT
Start: 2021-08-30 | End: 2021-08-30

## 2021-08-30 RX ORDER — ONDANSETRON 2 MG/ML
4 INJECTION INTRAMUSCULAR; INTRAVENOUS ONCE
Status: COMPLETED | OUTPATIENT
Start: 2021-08-30 | End: 2021-08-30

## 2021-08-30 RX ADMIN — ONDANSETRON 4 MG: 2 INJECTION INTRAMUSCULAR; INTRAVENOUS at 14:27

## 2021-08-30 RX ADMIN — HALOPERIDOL LACTATE 5 MG: 5 INJECTION, SOLUTION INTRAMUSCULAR at 14:31

## 2021-08-30 RX ADMIN — SODIUM CHLORIDE, SODIUM LACTATE, POTASSIUM CHLORIDE, AND CALCIUM CHLORIDE 1000 ML: .6; .31; .03; .02 INJECTION, SOLUTION INTRAVENOUS at 14:30

## 2021-08-30 NOTE — ED PROVIDER NOTES
History  Chief Complaint   Patient presents with    Abdominal Pain     pt c/o abd pain, seen in ed last week for similar issues, dx with colitis  52-year-old male with a past medical history of duodenal ulcer presenting to the ED due to burning epigastric pain since Thursday morning  Patient also complaining of bilious vomiting 20x and nausea  Eating usually helps his symptoms but he has not been able to keep any food down  Associated belching and GERD symptoms  He thinks his symptoms started because he ate a lot of meat  Patient was previously seen in the ED on 8/26 and 08/28 where he had a CT of the abdomen showing colitis, normal gallbladder ultrasound  Pt was supposed to be admitted on 8/28, but denied admission stating his ab pain was better and he would rather be comfortable at home  Patient was scheduled for gastric emptying study but did not show up because he was vomiting  Patient states he has been compliant with his Pepcid, Protonix, Carafate, Phenergan  States he has avoided all foods he is allergic to (gluten, wheat, milk, peanuts), denies NSAID use  Denies HA, back pain, urinary sx's, diarrhea, constipation  Prior to Admission Medications   Prescriptions Last Dose Informant Patient Reported?  Taking?   famotidine (PEPCID) 20 mg tablet 8/29/2021 at Unknown time  No Yes   Sig: Take 1 tablet (20 mg total) by mouth daily at bedtime   pantoprazole (PROTONIX) 40 mg tablet 8/30/2021 at Unknown time Self No Yes   Sig: TAKE ONE TABLET BY MOUTH TWICE DAILY BEFORE MEALS    promethazine (PHENERGAN) 12 5 MG tablet 8/30/2021 at Unknown time  No Yes   Sig: Take 1 tablet (12 5 mg total) by mouth every 6 (six) hours as needed for nausea or vomiting      Facility-Administered Medications: None       Past Medical History:   Diagnosis Date    Duodenal ulcer        Past Surgical History:   Procedure Laterality Date    EGD      UPPER GASTROINTESTINAL ENDOSCOPY         Family History   Problem Relation Age of Onset    Lung cancer Maternal Grandmother      I have reviewed and agree with the history as documented  E-Cigarette/Vaping    E-Cigarette Use Never User      E-Cigarette/Vaping Substances     Social History     Tobacco Use    Smoking status: Former Smoker     Types: Cigarettes    Smokeless tobacco: Never Used   Vaping Use    Vaping Use: Never used   Substance Use Topics    Alcohol use: Not Currently    Drug use: Not Currently     Comment: Marijuana - stopped 1 year ago        Review of Systems   Constitutional: Negative for chills and fever  HENT: Negative for ear pain and sore throat  Eyes: Negative for pain and visual disturbance  Respiratory: Negative for cough and shortness of breath  Cardiovascular: Negative for chest pain and palpitations  Gastrointestinal: Positive for abdominal pain, nausea and vomiting  Negative for blood in stool, constipation and diarrhea  Genitourinary: Negative for difficulty urinating, dysuria, flank pain, frequency and hematuria  Musculoskeletal: Negative for arthralgias and back pain  Skin: Negative for color change and rash  Neurological: Negative for seizures and syncope  All other systems reviewed and are negative  Physical Exam  ED Triage Vitals   Temperature Pulse Respirations Blood Pressure SpO2   08/30/21 1139 08/30/21 1139 08/30/21 1139 08/30/21 1139 08/30/21 1139   98 9 °F (37 2 °C) 86 18 148/62 99 %      Temp Source Heart Rate Source Patient Position - Orthostatic VS BP Location FiO2 (%)   08/30/21 1139 08/30/21 1139 08/30/21 1432 08/30/21 1139 --   Oral Monitor Lying Left arm       Pain Score       --                    Orthostatic Vital Signs  Vitals:    08/30/21 1139 08/30/21 1432   BP: 148/62 137/75   Pulse: 86 56   Patient Position - Orthostatic VS:  Lying       Physical Exam  Constitutional:       Appearance: He is well-developed  HENT:      Head: Normocephalic and atraumatic        Mouth/Throat:      Pharynx: Oropharynx is clear  Cardiovascular:      Rate and Rhythm: Normal rate and regular rhythm  Pulses:           Carotid pulses are 2+ on the right side and 2+ on the left side  Heart sounds: Normal heart sounds  Pulmonary:      Effort: Pulmonary effort is normal       Breath sounds: Normal breath sounds  Abdominal:      General: Bowel sounds are normal  There is no abdominal bruit  Palpations: Abdomen is soft  Tenderness: There is abdominal tenderness in the epigastric area  There is no guarding or rebound  Negative signs include Mathews's sign  Comments: No rebound or distension   Musculoskeletal:      Right lower leg: No edema  Left lower leg: No edema  Skin:     General: Skin is warm  Capillary Refill: Capillary refill takes less than 2 seconds  Neurological:      General: No focal deficit present  Mental Status: He is alert           ED Medications  Medications   lactated ringers infusion 1,000 mL (0 mL Intravenous Stopped 8/30/21 1557)   ondansetron (ZOFRAN) injection 4 mg (4 mg Intravenous Given 8/30/21 1427)   haloperidol lactate (HALDOL) injection 5 mg (5 mg Intramuscular Given 8/30/21 1431)       Diagnostic Studies  Results Reviewed     Procedure Component Value Units Date/Time    CBC and differential [[id number]]  (Abnormal) Collected: 08/30/21 1427    Lab Status: Final result Specimen: Blood from Arm, Right Updated: 08/30/21 1505     WBC 13 69 Thousand/uL      RBC 5 31 Million/uL      Hemoglobin 14 8 g/dL      Hematocrit 43 3 %      MCV 82 fL      MCH 27 9 pg      MCHC 34 2 g/dL      RDW 12 5 %      MPV 12 0 fL      Platelets 171 Thousands/uL      nRBC 0 /100 WBCs      Neutrophils Relative 65 %      Immat GRANS % 1 %      Lymphocytes Relative 23 %      Monocytes Relative 10 %      Eosinophils Relative 0 %      Basophils Relative 1 %      Neutrophils Absolute 8 97 Thousands/µL      Immature Grans Absolute 0 09 Thousand/uL      Lymphocytes Absolute 3 11 Thousands/µL Monocytes Absolute 1 36 Thousand/µL      Eosinophils Absolute 0 02 Thousand/µL      Basophils Absolute 0 14 Thousands/µL     Comprehensive metabolic panel [[id number]]  (Abnormal) Collected: 08/30/21 1427    Lab Status: Final result Specimen: Blood from Arm, Right Updated: 08/30/21 1502     Sodium 138 mmol/L      Potassium 3 6 mmol/L      Chloride 101 mmol/L      CO2 24 mmol/L      ANION GAP 13 mmol/L      BUN 14 mg/dL      Creatinine 0 81 mg/dL      Glucose 100 mg/dL      Calcium 9 2 mg/dL      AST 28 U/L      ALT 28 U/L      Alkaline Phosphatase 74 U/L      Total Protein 8 0 g/dL      Albumin 4 3 g/dL      Total Bilirubin 1 15 mg/dL      eGFR 119 ml/min/1 73sq m     Narrative:      [place] guidelines for Chronic Kidney Disease (CKD):     Stage 1 with normal or high GFR (GFR > 90 mL/min/1 73 square meters)    Stage 2 Mild CKD (GFR = 60-89 mL/min/1 73 square meters)    Stage 3A Moderate CKD (GFR = 45-59 mL/min/1 73 square meters)    Stage 3B Moderate CKD (GFR = 30-44 mL/min/1 73 square meters)    Stage 4 Severe CKD (GFR = 15-29 mL/min/1 73 square meters)    Stage 5 End Stage CKD (GFR <15 mL/min/1 73 square meters)  Note: GFR calculation is accurate only with a steady state creatinine    Lipase [[id number]]  (Abnormal) Collected: 08/30/21 1427    Lab Status: Final result Specimen: Blood from Arm, Right Updated: 08/30/21 1502     Lipase 71 u/L                  No orders to display         Procedures  Procedures      ED Course  ED Course as of Aug 30 1624   Mon Aug 30, 2021   [address] [place] [address] NINFA Yañez PA for Dr Raul Reece asking if they had recommendations for this pt as Dr Raul Reece is pt's GI doctor  She recommended PPI, GERD modifications, anti-emetics  If pt can't tolerate PO, admission warranted for EGD       1425 Dr Talya Hall completed US of abdomen and did not see any free fluid  [place] to patient about admission   He does not want to be admitted as he is "feeling better, hasnt thrown up in 12 hours"  Asked him if he would be willing to eat some food here to see if he can keep it down; he said "I would rather eat at home"  Explained risks of not being admitted for further workup, pt understands the risks including death but insists he is feeling better and wants to go home  [place] to pt about importance of following up with Dr Erin Youngblood for EGD and gastric emptying study, pt understood and agreed  MDM  Number of Diagnoses or Management Options  Epigastric pain  Nausea and vomiting  Diagnosis management comments: 28year old male with burning epigastric pain and vomiting for 5 days  DDx including but not limited to: PUD, GERD, esophagitis, gastritis, pancreatitis  Low suspicion for perforation-absent free fluid on US done bedside by Dr Rob Piña and no peritonitis symptoms  Low suspicion for pancreatitis due to normal lipase  Suspect his abdominal pain, N/V most likely due to PUD as pt states he usually has these symptoms during a "duodenal ulcer flare"  Did not obtain CT abdomen because pt had one 2 days ago  Final disposition: discharge home  Spoke to patient about admission for EGD and gastric emptying study  He does not want to be admitted as he is "feeling better, hasn't thrown up in 12 hours"  Asked him if he would be willing to eat some food here to see if he tolerate PO; he said "I would rather eat at home"  Explained risks of not being admitted for further workup, pt understands the risks including death but insists he is feeling better and wants to go home  Explained importance of following up with Dr Erin Youngblood        Disposition  Final diagnoses:   Nausea and vomiting   Epigastric pain     Time reflects when diagnosis was documented in both MDM as applicable and the Disposition within this note     Time User Action Codes Description Comment    8/30/2021  3:51 PM Harry Herndon Add [R11 2] Nausea and vomiting     8/30/2021  3:51 PM Marilee Anshu Add [R10 13] Epigastric pain       ED Disposition     ED Disposition Condition Date/Time Comment    Discharge Stable Mon Aug 30, 2021  3:51 PM Royal Mamadou Jules discharge to home/self care  Follow-up Information     Follow up With Specialties Details Why Contact Info    Gui Cagle MD Gastroenterology Call in 3 days FOLLOW UP WITH DR Duyen Castro FOR EGD AND GASTRIC EMPTYING STUDY  [address] [facility] [address]  [phone]            Discharge Medication List as of 8/30/2021  3:53 PM      CONTINUE these medications which have NOT CHANGED    Details   famotidine (PEPCID) 20 mg tablet Take 1 tablet (20 mg total) by mouth daily at bedtime, Starting Fri 8/27/2021, Normal      pantoprazole (PROTONIX) 40 mg tablet TAKE ONE TABLET BY MOUTH TWICE DAILY BEFORE MEALS , Normal      promethazine (PHENERGAN) 12 5 MG tablet Take 1 tablet (12 5 mg total) by mouth every 6 (six) hours as needed for nausea or vomiting, Starting Fri 8/27/2021, Normal           No discharge procedures on file  PDMP Review       Value Time User    PDMP Reviewed  Yes 8/28/2021  8:32 AM Shanita Babcock PA-C           ED Provider  Attending physically available and evaluated Sonny Melgoza  I managed the patient along with the ED Attending      Electronically Signed by         Kal Colbert MD  08/30/21 1643

## 2021-08-30 NOTE — ED PROCEDURE NOTE
Procedure  POC FAST US    Date/Time: 8/30/2021 3:42 PM  Performed by: Levi Fountain DO  Authorized by: Levi Fountain DO     Patient location:  ED  Other Assisting Provider: Yes (comment) (Dr Mini Kidd and Dr Berto Lynn)    Procedure details:     Exam Type:  Diagnostic    Indications: abdominal pain      Assess for:  Intra-abdominal fluid    Technique: FAST      Views obtained:  RUQ - Bowden's Pouch, LUQ - Splenorenal space and Suprapubic - Pouch of [place]    Image quality: diagnostic      Image availability:  Images available in PACS and video obtained  FAST Findings:     RUQ (Hepatorenal) free fluid: absent      LUQ (Splenorenal) free fluid: absent      Suprapubic free fluid: absent      Cardiac wall motion: identified    Interpretation:     Impressions: negative                       Levi Fountain DO  08/30/21 1543

## 2021-08-31 ENCOUNTER — TELEPHONE (OUTPATIENT)
Dept: GASTROENTEROLOGY | Facility: CLINIC | Age: 31
End: 2021-08-31

## 2021-08-31 LAB
ATRIAL RATE: 52 BPM
P AXIS: 46 DEGREES
PR INTERVAL: 160 MS
QRS AXIS: 74 DEGREES
QRSD INTERVAL: 102 MS
QT INTERVAL: 448 MS
QTC INTERVAL: 416 MS
T WAVE AXIS: 40 DEGREES
VENTRICULAR RATE: 52 BPM

## 2021-08-31 PROCEDURE — 93010 ELECTROCARDIOGRAM REPORT: CPT | Performed by: INTERNAL MEDICINE

## 2021-08-31 NOTE — ED ATTENDING ATTESTATION
8/30/2021  IShasta MD, saw and evaluated the patient  I have discussed the patient with the resident/non-physician practitioner and agree with the resident's/non-physician practitioner's findings, Plan of Care, and MDM as documented in the resident's/non-physician practitioner's note, except where noted  All available labs and Radiology studies were reviewed  I was present for key portions of any procedure(s) performed by the resident/non-physician practitioner and I was immediately available to provide assistance  At this point I agree with the current assessment done in the Emergency Department    I have conducted an independent evaluation of this patient a history and physical is as follows: see hand and p above - agree with resident tx/disposition     ED Course  ED Course as of Aug 31 0931   Mon Aug 30, 2021   1521 Er md note- pt- re-evaluated- does not feel any better-- discussed with pt and mother about admit for possible repeat ecg- they want to discuss this among themselves will check back             Critical Care Time  Procedures

## 2021-08-31 NOTE — TELEPHONE ENCOUNTER
Pt called returning your call  Inquired if there was another number to be reached at  Pt stated to use the same number you tried calling

## 2021-08-31 NOTE — TELEPHONE ENCOUNTER
Patients GI provider:  Dr Darline Zacarias    Number to return call: [phone]    Reason for call: Pt's mother called stating the meds that were given to pt in the ED yesterday have him very restless  Would like to know what he can do or is there something else he can take?     Scheduled procedure/appointment date if applicable: procedure 48/9/94

## 2021-08-31 NOTE — TELEPHONE ENCOUNTER
Attempted to call pt as I did not have his mothers number, however I was unable to reach pt  VM full   Could not leave [facility]

## 2021-08-31 NOTE — TELEPHONE ENCOUNTER
Called pt and pt informed me he is unaware of why haldol was given to him in ED  I explained I also was unaware  Pt stated he feels restless and wants to counteract the side effects  I informed pt this was out of the scope of my specialty  I advised contacting PCP or going to urgent care   Pt agreeable

## 2021-09-03 LAB
BACTERIA BLD CULT: NORMAL
BACTERIA BLD CULT: NORMAL

## 2021-11-02 ENCOUNTER — TELEPHONE (OUTPATIENT)
Dept: GASTROENTEROLOGY | Facility: AMBULARY SURGERY CENTER | Age: 31
End: 2021-11-02

## 2021-11-03 ENCOUNTER — TELEPHONE (OUTPATIENT)
Dept: GASTROENTEROLOGY | Facility: AMBULARY SURGERY CENTER | Age: 31
End: 2021-11-03

## 2021-11-03 ENCOUNTER — ANESTHESIA EVENT (OUTPATIENT)
Dept: GASTROENTEROLOGY | Facility: AMBULARY SURGERY CENTER | Age: 31
End: 2021-11-03

## 2021-11-03 ENCOUNTER — HOSPITAL ENCOUNTER (OUTPATIENT)
Dept: GASTROENTEROLOGY | Facility: AMBULARY SURGERY CENTER | Age: 31
Setting detail: OUTPATIENT SURGERY
Discharge: HOME | End: 2021-11-03
Attending: INTERNAL MEDICINE | Admitting: INTERNAL MEDICINE
Payer: COMMERCIAL

## 2021-11-03 ENCOUNTER — ANESTHESIA (OUTPATIENT)
Dept: GASTROENTEROLOGY | Facility: AMBULARY SURGERY CENTER | Age: 31
End: 2021-11-03

## 2021-11-03 VITALS
SYSTOLIC BLOOD PRESSURE: 123 MMHG | OXYGEN SATURATION: 98 % | RESPIRATION RATE: 18 BRPM | HEART RATE: 72 BPM | DIASTOLIC BLOOD PRESSURE: 59 MMHG | TEMPERATURE: 97.5 F | BODY MASS INDEX: 26.64 KG/M2 | WEIGHT: 191 LBS

## 2021-11-03 DIAGNOSIS — R10.13 EPIGASTRIC PAIN: ICD-10-CM

## 2021-11-03 PROCEDURE — 43239 EGD BIOPSY SINGLE/MULTIPLE: CPT | Performed by: INTERNAL MEDICINE

## 2021-11-03 PROCEDURE — 88305 TISSUE EXAM BY PATHOLOGIST: CPT | Performed by: PATHOLOGY

## 2021-11-03 RX ORDER — SODIUM CHLORIDE, SODIUM LACTATE, POTASSIUM CHLORIDE, CALCIUM CHLORIDE 600; 310; 30; 20 MG/100ML; MG/100ML; MG/100ML; MG/100ML
INJECTION, SOLUTION INTRAVENOUS CONTINUOUS PRN
Status: DISCONTINUED | OUTPATIENT
Start: 2021-11-03 | End: 2021-11-03

## 2021-11-03 RX ORDER — PROPOFOL 10 MG/ML
INJECTION, EMULSION INTRAVENOUS AS NEEDED
Status: DISCONTINUED | OUTPATIENT
Start: 2021-11-03 | End: 2021-11-03

## 2021-11-03 RX ADMIN — PROPOFOL 50 MG: 10 INJECTION, EMULSION INTRAVENOUS at 14:03

## 2021-11-03 RX ADMIN — PROPOFOL 50 MG: 10 INJECTION, EMULSION INTRAVENOUS at 13:59

## 2021-11-03 RX ADMIN — SODIUM CHLORIDE, SODIUM LACTATE, POTASSIUM CHLORIDE, AND CALCIUM CHLORIDE: .6; .31; .03; .02 INJECTION, SOLUTION INTRAVENOUS at 13:53

## 2021-11-03 RX ADMIN — PROPOFOL 50 MG: 10 INJECTION, EMULSION INTRAVENOUS at 14:05

## 2021-11-03 RX ADMIN — PROPOFOL 50 MG: 10 INJECTION, EMULSION INTRAVENOUS at 14:00

## 2021-11-03 RX ADMIN — PROPOFOL 250 MG: 10 INJECTION, EMULSION INTRAVENOUS at 13:57

## 2021-11-03 RX ADMIN — PROPOFOL 50 MG: 10 INJECTION, EMULSION INTRAVENOUS at 14:02

## 2024-03-26 ENCOUNTER — APPOINTMENT (EMERGENCY)
Dept: RADIOLOGY | Facility: HOSPITAL | Age: 34
DRG: 241 | End: 2024-03-26
Payer: COMMERCIAL

## 2024-03-26 ENCOUNTER — HOSPITAL ENCOUNTER (INPATIENT)
Facility: HOSPITAL | Age: 34
LOS: 1 days | Discharge: HOME | DRG: 241 | End: 2024-03-27
Attending: EMERGENCY MEDICINE | Admitting: INTERNAL MEDICINE
Payer: COMMERCIAL

## 2024-03-26 DIAGNOSIS — R11.2 INTRACTABLE NAUSEA AND VOMITING: ICD-10-CM

## 2024-03-26 DIAGNOSIS — R10.10 PAIN OF UPPER ABDOMEN: ICD-10-CM

## 2024-03-26 DIAGNOSIS — R10.13 EPIGASTRIC PAIN: ICD-10-CM

## 2024-03-26 DIAGNOSIS — K26.9 DUODENAL ULCER: ICD-10-CM

## 2024-03-26 DIAGNOSIS — K92.0 HEMATEMESIS, UNSPECIFIED WHETHER NAUSEA PRESENT: ICD-10-CM

## 2024-03-26 DIAGNOSIS — R11.2 NAUSEA & VOMITING: Primary | ICD-10-CM

## 2024-03-26 LAB
ALBUMIN SERPL BCP-MCNC: 5.1 G/DL (ref 3.5–5)
ALP SERPL-CCNC: 51 U/L (ref 34–104)
ALT SERPL W P-5'-P-CCNC: 30 U/L (ref 7–52)
ANION GAP SERPL CALCULATED.3IONS-SCNC: 13 MMOL/L (ref 4–13)
AST SERPL W P-5'-P-CCNC: 17 U/L (ref 13–39)
BASOPHILS # BLD MANUAL: 0 THOUSAND/UL (ref 0–0.1)
BASOPHILS NFR MAR MANUAL: 0 % (ref 0–1)
BILIRUB SERPL-MCNC: 0.77 MG/DL (ref 0.2–1)
BUN SERPL-MCNC: 19 MG/DL (ref 5–25)
CALCIUM SERPL-MCNC: 10.1 MG/DL (ref 8.4–10.2)
CHLORIDE SERPL-SCNC: 103 MMOL/L (ref 96–108)
CO2 SERPL-SCNC: 24 MMOL/L (ref 21–32)
CREAT SERPL-MCNC: 0.88 MG/DL (ref 0.6–1.3)
EOSINOPHIL # BLD MANUAL: 0 THOUSAND/UL (ref 0–0.4)
EOSINOPHIL NFR BLD MANUAL: 0 % (ref 0–6)
ERYTHROCYTE [DISTWIDTH] IN BLOOD BY AUTOMATED COUNT: 13.1 % (ref 11.6–15.1)
GFR SERPL CREATININE-BSD FRML MDRD: 113 ML/MIN/1.73SQ M
GLUCOSE SERPL-MCNC: 125 MG/DL (ref 65–140)
HCT VFR BLD AUTO: 47.2 % (ref 36.5–49.3)
HGB BLD-MCNC: 15.8 G/DL (ref 12–17)
LIPASE SERPL-CCNC: <6 U/L (ref 11–82)
LYMPHOCYTES # BLD AUTO: 11 % (ref 14–44)
LYMPHOCYTES # BLD AUTO: 2.09 THOUSAND/UL (ref 0.6–4.47)
MCH RBC QN AUTO: 27.7 PG (ref 26.8–34.3)
MCHC RBC AUTO-ENTMCNC: 33.5 G/DL (ref 31.4–37.4)
MCV RBC AUTO: 83 FL (ref 82–98)
MONOCYTES # BLD AUTO: 1.14 THOUSAND/UL (ref 0–1.22)
MONOCYTES NFR BLD: 6 % (ref 4–12)
NEUTROPHILS # BLD MANUAL: 15.8 THOUSAND/UL (ref 1.85–7.62)
NEUTS SEG NFR BLD AUTO: 83 % (ref 43–75)
PLATELET # BLD AUTO: 190 THOUSANDS/UL (ref 149–390)
PLATELET # BLD AUTO: 235 THOUSANDS/UL (ref 149–390)
PLATELET BLD QL SMEAR: ADEQUATE
PMV BLD AUTO: 11.5 FL (ref 8.9–12.7)
PMV BLD AUTO: 11.9 FL (ref 8.9–12.7)
POTASSIUM SERPL-SCNC: 3.6 MMOL/L (ref 3.5–5.3)
PROT SERPL-MCNC: 8.2 G/DL (ref 6.4–8.4)
RBC # BLD AUTO: 5.7 MILLION/UL (ref 3.88–5.62)
RBC MORPH BLD: NORMAL
SODIUM SERPL-SCNC: 140 MMOL/L (ref 135–147)
WBC # BLD AUTO: 19.04 THOUSAND/UL (ref 4.31–10.16)

## 2024-03-26 PROCEDURE — 85049 AUTOMATED PLATELET COUNT: CPT

## 2024-03-26 PROCEDURE — 99223 1ST HOSP IP/OBS HIGH 75: CPT | Performed by: INTERNAL MEDICINE

## 2024-03-26 PROCEDURE — 96361 HYDRATE IV INFUSION ADD-ON: CPT

## 2024-03-26 PROCEDURE — 85027 COMPLETE CBC AUTOMATED: CPT

## 2024-03-26 PROCEDURE — 71046 X-RAY EXAM CHEST 2 VIEWS: CPT

## 2024-03-26 PROCEDURE — 96374 THER/PROPH/DIAG INJ IV PUSH: CPT

## 2024-03-26 PROCEDURE — 36415 COLL VENOUS BLD VENIPUNCTURE: CPT

## 2024-03-26 PROCEDURE — 83690 ASSAY OF LIPASE: CPT

## 2024-03-26 PROCEDURE — C9113 INJ PANTOPRAZOLE SODIUM, VIA: HCPCS

## 2024-03-26 PROCEDURE — 99285 EMERGENCY DEPT VISIT HI MDM: CPT | Performed by: EMERGENCY MEDICINE

## 2024-03-26 PROCEDURE — 80053 COMPREHEN METABOLIC PANEL: CPT

## 2024-03-26 PROCEDURE — 99284 EMERGENCY DEPT VISIT MOD MDM: CPT

## 2024-03-26 PROCEDURE — 93005 ELECTROCARDIOGRAM TRACING: CPT

## 2024-03-26 PROCEDURE — 85007 BL SMEAR W/DIFF WBC COUNT: CPT

## 2024-03-26 PROCEDURE — 96375 TX/PRO/DX INJ NEW DRUG ADDON: CPT

## 2024-03-26 PROCEDURE — 96376 TX/PRO/DX INJ SAME DRUG ADON: CPT

## 2024-03-26 RX ORDER — ACETAMINOPHEN 325 MG/1
650 TABLET ORAL EVERY 6 HOURS PRN
Status: DISCONTINUED | OUTPATIENT
Start: 2024-03-26 | End: 2024-03-27 | Stop reason: HOSPADM

## 2024-03-26 RX ORDER — ACETAMINOPHEN 325 MG/1
650 TABLET ORAL EVERY 6 HOURS PRN
Status: DISCONTINUED | OUTPATIENT
Start: 2024-03-26 | End: 2024-03-26

## 2024-03-26 RX ORDER — ENOXAPARIN SODIUM 100 MG/ML
40 INJECTION SUBCUTANEOUS DAILY
Status: DISCONTINUED | OUTPATIENT
Start: 2024-03-26 | End: 2024-03-27 | Stop reason: HOSPADM

## 2024-03-26 RX ORDER — SODIUM CHLORIDE, SODIUM GLUCONATE, SODIUM ACETATE, POTASSIUM CHLORIDE, MAGNESIUM CHLORIDE, SODIUM PHOSPHATE, DIBASIC, AND POTASSIUM PHOSPHATE .53; .5; .37; .037; .03; .012; .00082 G/100ML; G/100ML; G/100ML; G/100ML; G/100ML; G/100ML; G/100ML
100 INJECTION, SOLUTION INTRAVENOUS CONTINUOUS
Status: DISCONTINUED | OUTPATIENT
Start: 2024-03-26 | End: 2024-03-27 | Stop reason: HOSPADM

## 2024-03-26 RX ORDER — MAGNESIUM HYDROXIDE/ALUMINUM HYDROXICE/SIMETHICONE 120; 1200; 1200 MG/30ML; MG/30ML; MG/30ML
30 SUSPENSION ORAL ONCE
Status: COMPLETED | OUTPATIENT
Start: 2024-03-26 | End: 2024-03-26

## 2024-03-26 RX ORDER — FAMOTIDINE 10 MG/ML
20 INJECTION, SOLUTION INTRAVENOUS ONCE
Status: COMPLETED | OUTPATIENT
Start: 2024-03-26 | End: 2024-03-26

## 2024-03-26 RX ORDER — PANTOPRAZOLE SODIUM 40 MG/10ML
40 INJECTION, POWDER, LYOPHILIZED, FOR SOLUTION INTRAVENOUS EVERY 12 HOURS SCHEDULED
Status: DISCONTINUED | OUTPATIENT
Start: 2024-03-26 | End: 2024-03-27 | Stop reason: HOSPADM

## 2024-03-26 RX ORDER — ONDANSETRON 2 MG/ML
4 INJECTION INTRAMUSCULAR; INTRAVENOUS ONCE
Status: COMPLETED | OUTPATIENT
Start: 2024-03-26 | End: 2024-03-26

## 2024-03-26 RX ORDER — DROPERIDOL 2.5 MG/ML
1.25 INJECTION, SOLUTION INTRAMUSCULAR; INTRAVENOUS ONCE
Status: COMPLETED | OUTPATIENT
Start: 2024-03-26 | End: 2024-03-26

## 2024-03-26 RX ORDER — SUCRALFATE 1 G/1
1 TABLET ORAL EVERY 6 HOURS SCHEDULED
Status: DISCONTINUED | OUTPATIENT
Start: 2024-03-26 | End: 2024-03-27 | Stop reason: HOSPADM

## 2024-03-26 RX ORDER — MAGNESIUM HYDROXIDE/ALUMINUM HYDROXICE/SIMETHICONE 120; 1200; 1200 MG/30ML; MG/30ML; MG/30ML
30 SUSPENSION ORAL EVERY 4 HOURS PRN
Status: DISCONTINUED | OUTPATIENT
Start: 2024-03-26 | End: 2024-03-27 | Stop reason: HOSPADM

## 2024-03-26 RX ORDER — SUCRALFATE 1 G/1
1 TABLET ORAL ONCE
Status: COMPLETED | OUTPATIENT
Start: 2024-03-26 | End: 2024-03-26

## 2024-03-26 RX ORDER — ONDANSETRON 2 MG/ML
4 INJECTION INTRAMUSCULAR; INTRAVENOUS EVERY 4 HOURS
Status: DISCONTINUED | OUTPATIENT
Start: 2024-03-26 | End: 2024-03-27 | Stop reason: HOSPADM

## 2024-03-26 RX ORDER — FAMOTIDINE 10 MG/ML
20 INJECTION, SOLUTION INTRAVENOUS DAILY
Status: DISCONTINUED | OUTPATIENT
Start: 2024-03-26 | End: 2024-03-26

## 2024-03-26 RX ORDER — SODIUM CHLORIDE 9 MG/ML
75 INJECTION, SOLUTION INTRAVENOUS CONTINUOUS
Status: CANCELLED | OUTPATIENT
Start: 2024-03-26

## 2024-03-26 RX ORDER — MAGNESIUM HYDROXIDE/ALUMINUM HYDROXICE/SIMETHICONE 120; 1200; 1200 MG/30ML; MG/30ML; MG/30ML
30 SUSPENSION ORAL ONCE
Status: DISCONTINUED | OUTPATIENT
Start: 2024-03-26 | End: 2024-03-26

## 2024-03-26 RX ORDER — KETOROLAC TROMETHAMINE 30 MG/ML
15 INJECTION, SOLUTION INTRAMUSCULAR; INTRAVENOUS ONCE
Status: COMPLETED | OUTPATIENT
Start: 2024-03-26 | End: 2024-03-26

## 2024-03-26 RX ADMIN — ONDANSETRON 4 MG: 2 INJECTION INTRAMUSCULAR; INTRAVENOUS at 14:35

## 2024-03-26 RX ADMIN — SODIUM CHLORIDE 1000 ML: 0.9 INJECTION, SOLUTION INTRAVENOUS at 07:33

## 2024-03-26 RX ADMIN — ALUMINUM HYDROXIDE, MAGNESIUM HYDROXIDE, AND DIMETHICONE 30 ML: 200; 20; 200 SUSPENSION ORAL at 07:58

## 2024-03-26 RX ADMIN — PANTOPRAZOLE SODIUM 40 MG: 40 INJECTION, POWDER, FOR SOLUTION INTRAVENOUS at 20:10

## 2024-03-26 RX ADMIN — FAMOTIDINE 20 MG: 10 INJECTION INTRAVENOUS at 09:06

## 2024-03-26 RX ADMIN — KETOROLAC TROMETHAMINE 15 MG: 30 INJECTION, SOLUTION INTRAMUSCULAR; INTRAVENOUS at 10:41

## 2024-03-26 RX ADMIN — DROPERIDOL 1.25 MG: 2.5 INJECTION, SOLUTION INTRAMUSCULAR; INTRAVENOUS at 07:33

## 2024-03-26 RX ADMIN — MORPHINE SULFATE 2 MG: 2 INJECTION, SOLUTION INTRAMUSCULAR; INTRAVENOUS at 20:09

## 2024-03-26 RX ADMIN — ONDANSETRON 4 MG: 2 INJECTION INTRAMUSCULAR; INTRAVENOUS at 10:40

## 2024-03-26 RX ADMIN — SUCRALFATE 1 G: 1 TABLET ORAL at 14:35

## 2024-03-26 RX ADMIN — SODIUM CHLORIDE 1000 ML: 0.9 INJECTION, SOLUTION INTRAVENOUS at 10:39

## 2024-03-26 RX ADMIN — SODIUM CHLORIDE, SODIUM GLUCONATE, SODIUM ACETATE, POTASSIUM CHLORIDE, MAGNESIUM CHLORIDE, SODIUM PHOSPHATE, DIBASIC, AND POTASSIUM PHOSPHATE 100 ML/HR: .53; .5; .37; .037; .03; .012; .00082 INJECTION, SOLUTION INTRAVENOUS at 14:46

## 2024-03-26 RX ADMIN — ONDANSETRON 4 MG: 2 INJECTION INTRAMUSCULAR; INTRAVENOUS at 17:33

## 2024-03-26 RX ADMIN — PANTOPRAZOLE SODIUM 40 MG: 40 INJECTION, POWDER, FOR SOLUTION INTRAVENOUS at 14:35

## 2024-03-26 RX ADMIN — SUCRALFATE 1 G: 1 TABLET ORAL at 07:56

## 2024-03-26 RX ADMIN — ONDANSETRON 4 MG: 2 INJECTION INTRAMUSCULAR; INTRAVENOUS at 22:05

## 2024-03-26 RX ADMIN — ONDANSETRON 4 MG: 2 INJECTION INTRAMUSCULAR; INTRAVENOUS at 09:12

## 2024-03-26 NOTE — ED PROCEDURE NOTE
Procedure  POC Biliary US    Date/Time: 3/26/2024 9:50 AM    Performed by: Ian Cross MD  Authorized by: Ian Cross MD    Patient location:  ED  Performed by:  Resident  Procedure details:     Exam Type:  Diagnostic    Indications: epigastric pain and nausea      Assessment for:  Cholecystitis and cholelithiasis    Views obtained: gallbladder (transverse and longitudinal)      Image quality: diagnostic      Image availability:  Images available in PACS  Findings:     Cholelithiasis: not identified      Common bile duct:  Unable to visualize    Gallbladder wall:  Normal    Gallbladder wall thickness (mm):  2    Pericholecystic fluid: not identified      Sonographic Mathews's sign: negative      Polyps: not identified      Mass: not identified    Interpretation:     Biliary ultrasound impressions: normal gallbladder ultrasound    POC Renal US    Date/Time: 3/26/2024 9:52 AM    Performed by: Ian Cross MD  Authorized by: Ian Cross MD    Patient location:  ED  Performed by:  Resident  Procedure details:     Exam Type:  Diagnostic    Indications: abdominal pain      Views obtained: left kidney and right kidney      Image quality: diagnostic      Image availability:  Images available in PACS  Findings:     LEFT kidney findings: unremarkable      RIGHT kidney findings: unremarkable    Interpretation:     Renal ultrasound impressions: normal exam                     Ian Cross MD  03/26/24 0952

## 2024-03-26 NOTE — PLAN OF CARE
Problem: Nutrition/Hydration-ADULT  Goal: Nutrient/Hydration intake appropriate for improving, restoring or maintaining nutritional needs  Description: Monitor and assess patient's nutrition/hydration status for malnutrition. Collaborate with interdisciplinary team and initiate plan and interventions as ordered.  Monitor patient's weight and dietary intake as ordered or per policy. Utilize nutrition screening tool and intervene as necessary. Determine patient's food preferences and provide high-protein, high-caloric foods as appropriate.     INTERVENTIONS:  - Monitor oral intake, urinary output, labs, and treatment plans  - Assess nutrition and hydration status and recommend course of action  - Evaluate amount of meals eaten  - Assist patient with eating if necessary   - Allow adequate time for meals  - Recommend/ encourage appropriate diets, oral nutritional supplements, and vitamin/mineral supplements  - Order, calculate, and assess calorie counts as needed  - Recommend, monitor, and adjust tube feedings and TPN/PPN based on assessed needs  - Assess need for intravenous fluids  - Provide specific nutrition/hydration education as appropriate  - Include patient/family/caregiver in decisions related to nutrition  Outcome: Progressing     Problem: PAIN - ADULT  Goal: Verbalizes/displays adequate comfort level or baseline comfort level  Description: Interventions:  - Encourage patient to monitor pain and request assistance  - Assess pain using appropriate pain scale  - Administer analgesics based on type and severity of pain and evaluate response  - Implement non-pharmacological measures as appropriate and evaluate response  - Consider cultural and social influences on pain and pain management  - Notify physician/advanced practitioner if interventions unsuccessful or patient reports new pain  Outcome: Progressing     Problem: INFECTION - ADULT  Goal: Absence or prevention of progression during  hospitalization  Description: INTERVENTIONS:  - Assess and monitor for signs and symptoms of infection  - Monitor lab/diagnostic results  - Monitor all insertion sites, i.e. indwelling lines, tubes, and drains  - Monitor endotracheal if appropriate and nasal secretions for changes in amount and color  - [place] appropriate cooling/warming therapies per order  - Administer medications as ordered  - Instruct and encourage patient and family to use good hand hygiene technique  - Identify and instruct in appropriate isolation precautions for identified infection/condition  Outcome: Progressing  Goal: Absence of fever/infection during neutropenic period  Description: INTERVENTIONS:  - Monitor WBC    Outcome: Progressing     Problem: SAFETY ADULT  Goal: Patient will remain free of falls  Description: INTERVENTIONS:  - Educate patient/family on patient safety including physical limitations  - Instruct patient to call for assistance with activity   - Consult OT/PT to assist with strengthening/mobility   - Keep Call bell within reach  - Keep bed low and locked with side rails adjusted as appropriate  - Keep care items and personal belongings within reach  - Initiate and maintain comfort rounds  - Make Fall Risk Sign visible to staff  - Offer Toileting every 2 Hours, in advance of need  - Initiate/Maintain 2 alarm  - Obtain necessary fall risk management equipment: 2  - Apply yellow socks and bracelet for high fall risk patients  - Consider moving patient to room near nurses station  Outcome: Progressing  Goal: Maintain or return to baseline ADL function  Description: INTERVENTIONS:  -  Assess patient's ability to carry out ADLs; assess patient's baseline for ADL function and identify physical deficits which impact ability to perform ADLs (bathing, care of mouth/teeth, toileting, grooming, dressing, etc.)  - Assess/evaluate cause of self-care deficits   - Assess range of motion  - Assess patient's mobility; develop plan if  impaired  - Assess patient's need for assistive devices and provide as appropriate  - Encourage maximum independence but intervene and supervise when necessary  - Involve family in performance of ADLs  - Assess for home care needs following discharge   - Consider OT consult to assist with ADL evaluation and planning for discharge  - Provide patient education as appropriate  Outcome: Progressing  Goal: Maintains/Returns to pre admission functional level  Description: INTERVENTIONS:  - Perform AM-PAC 6 Click Basic Mobility/ Daily Activity assessment daily.  - Set and communicate daily mobility goal to care team and patient/family/caregiver.   - Collaborate with rehabilitation services on mobility goals if consulted  - Perform Range of Motion 2 times a day.  - Reposition patient every 2 hours.  - Dangle patient 2 times a day  - Stand patient 2 times a day  - Ambulate patient 2 times a day  - Out of bed to chair 2 times a day   - Out of bed for meals 2 times a day  - Out of bed for toileting  - Record patient progress and toleration of activity level   Outcome: Progressing     Problem: DISCHARGE PLANNING  Goal: Discharge to home or other facility with appropriate resources  Description: INTERVENTIONS:  - Identify barriers to discharge w/patient and caregiver  - Arrange for needed discharge resources and transportation as appropriate  - Identify discharge learning needs (meds, wound care, etc.)  - Arrange for interpretive services to assist at discharge as needed  - Refer to Case Management Department for coordinating discharge planning if the patient needs post-hospital services based on physician/advanced practitioner order or complex needs related to functional status, cognitive ability, or social support system  Outcome: Progressing     Problem: Knowledge Deficit  Goal: Patient/family/caregiver demonstrates understanding of disease process, treatment plan, medications, and discharge instructions  Description:  Complete learning assessment and assess knowledge base.  Interventions:  - Provide teaching at level of understanding  - Provide teaching via preferred learning methods  Outcome: Progressing

## 2024-03-26 NOTE — ASSESSMENT & PLAN NOTE
Patient presents with one day history of severe epigastric burning, nausea and vomiting  Prior history consists of duodenal ulcer dx in 2021, followed up with GI as outpatient.  EGD was performed but unremarkable, protonix stopped at that time  Patient has been taking OTC lansoprazole since then, denies changes to diet.  Adherent to allergen restricted diet.   Gastric emptying studying was ordered but never completed, patient lost to follow up over last 3 years.  Has had intermittent episodes of similar symptoms  Pain is located in epigastrium and just above the umbilicus, does not radiate to the back or flanks.  Denies bladder pain.   Has had 3 episodes of loose nonbloody bowel movements since start of symptoms  Vomiting started as food turned bilious and finally with small amount of blood in it, now unable to tolerate food or water  Denies alcohol or illicit drug use.  Endorses more frequent tobacco smoking as of late  Patient hemodynamically stable  Abdomen soft and nontender to palpation, bowel sounds normal  Recent Labs     03/26/24  0732   WBC 19.04*     Recent Labs     03/26/24  0732   HGB 15.8      Electrolytes and renal function intact  Hepatic panel intact  Lipase wnl  Xray on my read shows no gas under diaphragm, lung fields appear unremarkable, diaphragm midline   Differential diagnosis includes but is not limited to gastric/duodenal ulcer, gastroenteritis, gastroparesis    Plan:  IV reglan scheduled  IV zofran prn  IV fluids  Clear liquid diet, advance as tolerated  Protonix 40 mg bid   Gastroenterology consult requested, recommendations are appreciated  Trend CBC   Trend BMP, correct electrolytes as needed

## 2024-03-26 NOTE — H&P
[facility]  H&P  Name: Matt Jules 33 y.o. male I MRN: [id number]  Unit/Bed#: W -01 I Date of Admission: 3/26/2024   Date of Service: 3/26/2024 I Hospital Day: 0      Assessment/Plan   Intractable nausea and vomiting  Assessment & Plan  Patient presents with one day history of severe epigastric burning, nausea and vomiting  Prior history consists of duodenal ulcer dx in 2021, followed up with GI as outpatient.  EGD was performed but unremarkable, protonix stopped at that time  Patient has been taking OTC lansoprazole since then, denies changes to diet.  Adherent to allergen restricted diet.   Gastric emptying studying was ordered but never completed, patient lost to follow up over last 3 years.  Has had intermittent episodes of similar symptoms  Pain is located in epigastrium and just above the umbilicus, does not radiate to the back or flanks.  Denies bladder pain.   Has had 3 episodes of loose nonbloody bowel movements since start of symptoms  Vomiting started as food turned bilious and finally with small amount of blood in it, now unable to tolerate food or water  Denies alcohol or illicit drug use.  Endorses more frequent tobacco smoking as of late  Patient hemodynamically stable  Abdomen soft and nontender to palpation, bowel sounds normal  Recent Labs     03/26/24  0732   WBC 19.04*     Recent Labs     03/26/24  0732   HGB 15.8      Electrolytes and renal function intact  Hepatic panel intact  Lipase wnl  Xray on my read shows no gas under diaphragm, lung fields appear unremarkable, diaphragm midline   Differential diagnosis includes but is not limited to gastric/duodenal ulcer, gastroenteritis, gastroparesis    Plan:  IV zofran scheduled  IM tigan prn  IV fluids  Clear liquid diet, advance as tolerated  Protonix 40 mg bid   Gastroenterology consult requested, recommendations are appreciated  Trend CBC   Trend BMP, correct electrolytes as needed        VTE Pharmacologic  Prophylaxis:   Low Risk (Score 0-2) - Encourage Ambulation.  Code Status: Level 1 - Full Code   Discussion with family: Updated  (mother) at bedside.    Anticipated Length of Stay: Patient will be admitted on an observation basis with an anticipated length of stay of less than 2 midnights secondary to abdominal pain/vomiting.    Chief Complaint: burning abdominal pain, nausea and vomiting    History of Present Illness:  Matt Jules is a 33 y.o. male with a PMH of duodenal ulcer and tobacco use who presents with one day of severe abdominal pain, nausea and vomiting.  Patient says he was in his normal state of health prior to yesterday evening.  Patient is a aware of his food allergies and tries to be conscious of what he is eating.  He has not made any changes to his diet lately.  Last night patient had frozen meatballs and spaghetti with sauce.  He says he ate too much and felt bloated after the meal.  Later that night he woke up with severe epigastric pain characterized as burning that radiated down his midline just above the umbilicus. He denies any flank/back pain or pain with defecation. He also has had intractable nausea and vomiting that started out as his dinner and turned bilious.  He mentions there may have been blood mixed in during the last episode he had prior to coming to the hospital. He has also had 3 episodes of loose nonbloody bowel movments since his symptoms began.  Patient says he is no longer able to tolerate any oral intake.      Review of Systems:  Review of Systems   Constitutional:  Negative for chills and fever.   HENT:  Negative for ear pain and sore throat.    Eyes:  Negative for pain and visual disturbance.   Respiratory:  Negative for cough and shortness of breath.    Cardiovascular:  Negative for chest pain and palpitations.   Gastrointestinal:  Positive for abdominal pain, nausea and vomiting.   Genitourinary:  Negative for dysuria and hematuria.   Musculoskeletal:   Negative for arthralgias and back pain.   Skin:  Negative for color change and rash.   Neurological:  Negative for seizures and syncope.   All other systems reviewed and are negative.      Past Medical and Surgical History:   Past Medical History:   Diagnosis Date    Duodenal ulcer        Past Surgical History:   Procedure Laterality Date    EGD      UPPER GASTROINTESTINAL ENDOSCOPY         Meds/Allergies:  Prior to Admission medications    Medication Sig Start Date End Date Taking? Authorizing Provider   famotidine (PEPCID) 20 mg tablet Take 1 tablet (20 mg total) by mouth daily at bedtime  Patient not taking: Reported on 3/26/2024 8/27/21   BASSAM Montaño   promethazine (PHENERGAN) 12.5 MG tablet Take 1 tablet (12.5 mg total) by mouth every 6 (six) hours as needed for nausea or vomiting  Patient not taking: Reported on 3/26/2024 8/27/21   BASSAM Montaño     I have reviewed home medications with patient personally.    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  Patient Pre-hospital Living Situation: Home  Patient Pre-hospital Level of Mobility: walks  Patient Pre-hospital Diet Restrictions: Egg, peanut, gluten, dairy  Substance Use History:   Social History     Substance and Sexual Activity   Alcohol Use Not Currently     Social History     Tobacco Use   Smoking Status Former    Types: Cigarettes   Smokeless Tobacco Never     Social History     Substance and Sexual Activity   Drug Use Not Currently    Comment: Marijuana - stopped 1 year ago       Family History:  Family History   Problem Relation Age of Onset    Lung cancer Maternal Grandmother        Physical Exam:     Vitals:   Blood Pressure: 124/58 (03/26/24 1330)  Pulse: 57 (03/26/24 1330)  Temperature: 97.5 °F (36.4 °C) (03/26/24 1330)  Temp Source: Oral (03/26/24 0724)  Respirations: 18 (03/26/24 0945)  Weight - Scale: 86.2 kg (190 lb) (03/26/24 1330)  SpO2: 95 %  (03/26/24 1330)    Physical Exam  Vitals and nursing note reviewed.   Constitutional:       General: He is not in acute distress.     Appearance: He is well-developed. He is ill-appearing.   HENT:      Head: Normocephalic and atraumatic.      Mouth/Throat:      Mouth: Mucous membranes are moist.      Pharynx: Oropharynx is clear. No oropharyngeal exudate.   Eyes:      Extraocular Movements: Extraocular movements intact.      Conjunctiva/sclera: Conjunctivae normal.      Pupils: Pupils are equal, round, and reactive to light.   Cardiovascular:      Rate and Rhythm: Normal rate. Rhythm irregular.      Pulses: Normal pulses.      Heart sounds: Normal heart sounds. No murmur heard.  Pulmonary:      Effort: Pulmonary effort is normal. No respiratory distress.      Breath sounds: Normal breath sounds.   Abdominal:      General: Abdomen is flat. Bowel sounds are normal. There is no distension.      Palpations: Abdomen is soft.      Tenderness: There is no abdominal tenderness. There is no guarding.   Musculoskeletal:         General: No swelling.      Cervical back: Neck supple.      Right lower leg: No edema.      Left lower leg: No edema.   Skin:     General: Skin is warm and dry.      Capillary Refill: Capillary refill takes less than 2 seconds.   Neurological:      General: No focal deficit present.      Mental Status: He is alert and oriented to person, place, and time.   Psychiatric:         Mood and Affect: Mood normal.          Additional Data:     Lab Results:  Results from last 7 days   Lab Units 03/26/24  1413 03/26/24  0732   WBC Thousand/uL  --  19.04*   HEMOGLOBIN g/dL  --  15.8   HEMATOCRIT %  --  47.2   PLATELETS Thousands/uL 190 235   LYMPHO PCT %  --  11*   MONO PCT %  --  6   EOS PCT %  --  0     Results from last 7 days   Lab Units 03/26/24  0732   SODIUM mmol/L 140   POTASSIUM mmol/L 3.6   CHLORIDE mmol/L 103   CO2 mmol/L 24   BUN mg/dL 19   CREATININE mg/dL 0.88   ANION GAP mmol/L 13   CALCIUM mg/dL  10.1   ALBUMIN g/dL 5.1*   TOTAL BILIRUBIN mg/dL 0.77   ALK PHOS U/L 51   ALT U/L 30   AST U/L 17   GLUCOSE RANDOM mg/dL 125                       Lines/Drains:  Invasive Devices       Peripheral Intravenous Line  Duration             Peripheral IV 03/26/24 Right Antecubital <1 day                        Imaging: Personally reviewed the following imaging: chest xray  XR chest 2 views   ED Interpretation by Katia Barrios MD (03/26 0815)   No acute cardiopulmonary process noted.  No free air under the diaphragm.          EKG and Other Studies Reviewed on Admission:   EKG: Sinus Bradycardia. HR 54, sinus arrhythmia.    ** Please Note: This note has been constructed using a voice recognition system. **

## 2024-03-26 NOTE — ED PROVIDER NOTES
History  Chief Complaint   Patient presents with    Vomiting     Pt presents to the ED with multiple episodes of vomiting. Hx of gi ulcer. Pt reports last episode  this morning, reports noticing small flecks of blood. Diffuse abd pain.      33-year-old male with a history of duodenal ulcer presents with N/V.  Patient states 24-hour history of frequent nausea and vomiting with innumerable episodes of vomiting.  Initially started out as food products transitioning to stomach acid and then had noted specks of possible blood in his vomitus this morning.  Previous history of similar episode a year ago after going to [place].  Had previously been seen for the same on multiple occasions and noted to have duodenal ulcer otherwise unremarkable EGD.  Was previously advised to get gastric emptying study but never had it performed.  Previous episodes of nausea and vomiting occur sporadically with no change in diet and last approximately a week before resolving on their own.  Has been taking PPI chronically for 1 year.  No exacerbating or relieving factors.  No change in diet.  Last oral intake last night consisting of spaghetti and meatballs which she has noted to cause gastric upset in the past.  Episode was preceded by 2 episodes of watery diarrhea.  Associated epigastric burning pain radiating up to the sternum.  Denies other medical history.  Currently taking PPI.  Denies previous abdominal surgeries.  Denies alcohol use or recreational drug use.  Occasional tobacco use.  Denies marijuana use.  Denies fevers, chills, chest pain, shortness of breath, chest pain, diaphoresis, exertional dyspnea, dysuria, frequency, urgency, changes in urinary or bowel habits, dark tarry stools, blood in stools, NSAID/aspirin use.      Vomiting      Prior to Admission Medications   Prescriptions Last Dose Informant Patient Reported? Taking?   famotidine (PEPCID) 20 mg tablet Not Taking  No No   Sig: Take 1 tablet (20 mg total) by mouth daily at  bedtime   Patient not taking: Reported on 3/26/2024   promethazine (PHENERGAN) 12.5 MG tablet Not Taking  No No   Sig: Take 1 tablet (12.5 mg total) by mouth every 6 (six) hours as needed for nausea or vomiting   Patient not taking: Reported on 3/26/2024      Facility-Administered Medications: None       Past Medical History:   Diagnosis Date    Duodenal ulcer        Past Surgical History:   Procedure Laterality Date    EGD      UPPER GASTROINTESTINAL ENDOSCOPY         Family History   Problem Relation Age of Onset    Lung cancer Maternal Grandmother      I have reviewed and agree with the history as documented.    E-Cigarette/Vaping    E-Cigarette Use Never User      E-Cigarette/Vaping Substances     Social History     Tobacco Use    Smoking status: Former     Types: Cigarettes    Smokeless tobacco: Never   Vaping Use    Vaping status: Never Used   Substance Use Topics    Alcohol use: Not Currently    Drug use: Not Currently     Comment: Marijuana - stopped 1 year ago        Review of Systems   Gastrointestinal:  Positive for vomiting.   All other systems reviewed and are negative.      Physical Exam  ED Triage Vitals   Temperature Pulse Respirations Blood Pressure SpO2   03/26/24 0724 03/26/24 0717 03/26/24 0717 03/26/24 0717 03/26/24 0717   98.6 °F (37 °C) 68 16 138/72 97 %      Temp Source Heart Rate Source Patient Position - Orthostatic VS BP Location FiO2 (%)   03/26/24 0724 03/26/24 0945 03/26/24 0717 03/26/24 0717 --   Oral Monitor Sitting Right arm       Pain Score       03/26/24 0717       6             Orthostatic Vital Signs  Vitals:    03/26/24 0717 03/26/24 0945 03/26/24 1330   BP: 138/72 110/52 124/58   Pulse: 68 (!) 54 57   Patient Position - Orthostatic VS: Sitting Lying        Physical Exam  Vitals and nursing note reviewed.   Constitutional:       General: He is in acute distress.      Appearance: Normal appearance. He is normal weight. He is not ill-appearing, toxic-appearing or diaphoretic.    HENT:      Head: Normocephalic and atraumatic.      Right Ear: External ear normal.      Left Ear: External ear normal.      Nose: Nose normal.      Mouth/Throat:      Mouth: Mucous membranes are moist.      Pharynx: Oropharynx is clear.   Eyes:      General: No scleral icterus.        Right eye: No discharge.         Left eye: No discharge.      Extraocular Movements: Extraocular movements intact.   Cardiovascular:      Rate and Rhythm: Normal rate and regular rhythm.      Pulses: Normal pulses.      Heart sounds: Normal heart sounds. No murmur heard.  Pulmonary:      Effort: Pulmonary effort is normal. No respiratory distress.      Breath sounds: Normal breath sounds. No stridor. No wheezing, rhonchi or rales.   Abdominal:      General: There is no distension.      Palpations: Abdomen is soft. There is no mass.      Tenderness: There is no abdominal tenderness. There is no right CVA tenderness, left CVA tenderness, guarding or rebound.      Hernia: No hernia is present.   Musculoskeletal:         General: No swelling, tenderness, deformity or signs of injury. Normal range of motion.      Cervical back: Neck supple.      Right lower leg: No edema.      Left lower leg: No edema.   Skin:     General: Skin is warm and dry.      Capillary Refill: Capillary refill takes less than 2 seconds.      Coloration: Skin is not cyanotic, jaundiced or pale.      Findings: No bruising, erythema, lesion, petechiae or rash.   Neurological:      General: No focal deficit present.      Mental Status: He is alert and oriented to person, place, and time. Mental status is at baseline.   Psychiatric:         Mood and Affect: Mood normal.         Behavior: Behavior normal.         ED Medications  Medications   enoxaparin (LOVENOX) subcutaneous injection 40 mg (has no administration in time range)   pantoprazole (PROTONIX) injection 40 mg (40 mg Intravenous Given 3/26/24 1435)   sucralfate (CARAFATE) tablet 1 g (1 g Oral Given 3/26/24  1435)   ondansetron (ZOFRAN) injection 4 mg (4 mg Intravenous Given 3/26/24 1435)   trimethobenzamide (TIGAN) IM injection 200 mg (has no administration in time range)   multi-electrolyte (PLASMALYTE-A/ISOLYTE-S PH 7.4) IV solution (has no administration in time range)   acetaminophen (TYLENOL) tablet 650 mg (has no administration in time range)   sodium chloride 0.9 % bolus 1,000 mL (0 mL Intravenous Stopped 3/26/24 0948)   droperidol (INAPSINE) injection 1.25 mg (1.25 mg Intravenous Given 3/26/24 0733)   sucralfate (CARAFATE) tablet 1 g (1 g Oral Given 3/26/24 0756)   aluminum-magnesium hydroxide-simethicone (MAALOX) oral suspension 30 mL (30 mL Oral Given 3/26/24 0758)   Famotidine (PF) (PEPCID) injection 20 mg (20 mg Intravenous Given 3/26/24 0906)   ondansetron (ZOFRAN) injection 4 mg (4 mg Intravenous Given 3/26/24 0912)   ondansetron (ZOFRAN) injection 4 mg (4 mg Intravenous Given 3/26/24 1040)   ketorolac (TORADOL) injection 15 mg (15 mg Intravenous Given 3/26/24 1041)   sodium chloride 0.9 % bolus 1,000 mL (0 mL Intravenous Stopped 3/26/24 1413)       Diagnostic Studies  Results Reviewed       Procedure Component Value Units Date/Time    RBC Morphology Reflex Test [[id number]] Collected: 03/26/24 0732    Lab Status: Final result Specimen: Blood from Arm, Right Updated: 03/26/24 0901    CBC and differential [[id number]]  (Abnormal) Collected: 03/26/24 0732    Lab Status: Final result Specimen: Blood from Arm, Right Updated: 03/26/24 0821     WBC 19.04 Thousand/uL      RBC 5.70 Million/uL      Hemoglobin 15.8 g/dL      Hematocrit 47.2 %      MCV 83 fL      MCH 27.7 pg      MCHC 33.5 g/dL      RDW 13.1 %      MPV 11.9 fL      Platelets 235 Thousands/uL     Narrative:      This is an appended report.  These results have been appended to a previously verified report.    Manual Differential(PHLEBS Do Not Order) [[id number]]  (Abnormal) Collected: 03/26/24 0732    Lab Status: Final result Specimen: Blood from Arm,  Right Updated: 03/26/24 0821     Segmented % 83 %      Lymphocytes % 11 %      Monocytes % 6 %      Eosinophils % 0 %      Basophils % 0 %      Absolute Neutrophils 15.80 Thousand/uL      Absolute Lymphocytes 2.09 Thousand/uL      Absolute Monocytes 1.14 Thousand/uL      Absolute Eosinophils 0.00 Thousand/uL      Absolute Basophils 0.00 Thousand/uL      Total Counted --     RBC Morphology Normal     Platelet Estimate Adequate    Comprehensive metabolic panel [[id number]]  (Abnormal) Collected: 03/26/24 0732    Lab Status: Final result Specimen: Blood from Arm, Right Updated: 03/26/24 0816     Sodium 140 mmol/L      Potassium 3.6 mmol/L      Chloride 103 mmol/L      CO2 24 mmol/L      ANION GAP 13 mmol/L      BUN 19 mg/dL      Creatinine 0.88 mg/dL      Glucose 125 mg/dL      Calcium 10.1 mg/dL      AST 17 U/L      ALT 30 U/L      Alkaline Phosphatase 51 U/L      Total Protein 8.2 g/dL      Albumin 5.1 g/dL      Total Bilirubin 0.77 mg/dL      eGFR 113 ml/min/1.73sq m     Narrative:      National Kid[employer] Disease Foundation guidelines for Chronic Kidney Disease (CKD):     Stage 1 with normal or high GFR (GFR > 90 mL/min/1.73 square meters)    Stage 2 Mild CKD (GFR = 60-89 mL/min/1.73 square meters)    Stage 3A Moderate CKD (GFR = 45-59 mL/min/1.73 square meters)    Stage 3B Moderate CKD (GFR = 30-44 mL/min/1.73 square meters)    Stage 4 Severe CKD (GFR = 15-29 mL/min/1.73 square meters)    Stage 5 End Stage CKD (GFR <15 mL/min/1.73 square meters)  Note: GFR calculation is accurate only with a steady state creatinine    Lipase [[id number]]  (Abnormal) Collected: 03/26/24 0732    Lab Status: Final result Specimen: Blood from Arm, Right Updated: 03/26/24 0816     Lipase <6 u/L                    XR chest 2 views   ED Interpretation by Katia Barrios MD (03/26 0815)   No acute cardiopulmonary process noted.  No free air under the diaphragm.            Procedures  Procedures      ED Course  ED Course as of 03/26/24 1443    Tue Mar 26, 2024   0706 EGD 8/21  IMPRESSION:  · The esophagus appeared normal.  · Stomach-small amount of soft food material in the fundus was washed off and suctioned.  Gastric mucosa was normal status post biopsies.  · The duodenum appeared normal. Performed random biopsy.   0812 EKG sinus bradycardia rate of 54, normal OH interval, normal QRS interval, QTc 417, normal axis, no ST elevations, no ST depressions, no ischemic changes noted or STEMI.                                       Medical Decision Making  33-year-old male presents with nausea, vomiting, diarrhea and epigastric burning abdominal pain with previous history of duodenal ulcer.  Differential includes but not limited to gastroparesis, cyclic vomiting syndrome, electrolyte abnormality, gastroenteritis  Abdomen is soft and nontender with no peritoneal signs, doubtful acute abdomen.    Droperidol, famotidine, Carafate, Maalox given  Patient states continued nausea and able to tolerate p.o. intake.  Zofran given.  Patient states that he has continued nausea and unable to tolerate p.o. intake.    Case discussed with internal medicine team and patient admitted to Dr. Sorenson for intractable nausea w/ inability to tolerate po intake.     Amount and/or Complexity of Data Reviewed  Labs: ordered.  Radiology: ordered and independent interpretation performed.    Risk  OTC drugs.  Prescription drug management.  Decision regarding hospitalization.          Disposition  Final diagnoses:   Nausea & vomiting   Epigastric pain     Time reflects when diagnosis was documented in both MDM as applicable and the Disposition within this note       Time User Action Codes Description Comment    3/26/2024 11:01 AM Katia Barrios Add [R11.2] Nausea & vomiting     3/26/2024 11:01 AM Katia Barrios Add [R10.13] Epigastric pain     3/26/2024  2:18 PM Brien Rae Add [R11.2] Intractable nausea and vomiting     3/26/2024  2:18 PM Brien Rae Add [K26.9] Duodenal ulcer            ED Disposition       ED Disposition   Admit    Condition   Stable    Date/Time   Tue Mar 26, 2024 11:01 AM    Comment   Case was discussed with Yas and the patient's admission status was agreed to be Admission Status: inpatient status to the service of Dr. Sorenson .               Follow-up Information    None         Current Discharge Medication List        CONTINUE these medications which have NOT CHANGED    Details   famotidine (PEPCID) 20 mg tablet Take 1 tablet (20 mg total) by mouth daily at bedtime  Qty: 30 tablet, Refills: 1    Associated Diagnoses: Epigastric pain      promethazine (PHENERGAN) 12.5 MG tablet Take 1 tablet (12.5 mg total) by mouth every 6 (six) hours as needed for nausea or vomiting  Qty: 30 tablet, Refills: 2    Associated Diagnoses: Nausea and vomiting, intractability of vomiting not specified, unspecified vomiting type           No discharge procedures on file.    PDMP Review         Value Time User    PDMP Reviewed  Yes 8/28/2021  8:32 AM Driss Mcgarry PA-C             ED Provider  Attending physically available and evaluated Matt Jules. I managed the patient along with the ED Attending.    Electronically Signed by           Katia Barrios MD  03/26/24 0395

## 2024-03-27 ENCOUNTER — APPOINTMENT (INPATIENT)
Dept: GASTROENTEROLOGY | Facility: HOSPITAL | Age: 34
DRG: 241 | End: 2024-03-27
Payer: COMMERCIAL

## 2024-03-27 ENCOUNTER — ANESTHESIA EVENT (INPATIENT)
Dept: GASTROENTEROLOGY | Facility: HOSPITAL | Age: 34
DRG: 241 | End: 2024-03-27
Payer: COMMERCIAL

## 2024-03-27 ENCOUNTER — ANESTHESIA (INPATIENT)
Dept: GASTROENTEROLOGY | Facility: HOSPITAL | Age: 34
DRG: 241 | End: 2024-03-27
Payer: COMMERCIAL

## 2024-03-27 VITALS
TEMPERATURE: 97.7 F | RESPIRATION RATE: 17 BRPM | BODY MASS INDEX: 26.6 KG/M2 | OXYGEN SATURATION: 98 % | SYSTOLIC BLOOD PRESSURE: 98 MMHG | HEIGHT: 71 IN | HEART RATE: 51 BPM | DIASTOLIC BLOOD PRESSURE: 62 MMHG | WEIGHT: 190 LBS

## 2024-03-27 LAB
ALBUMIN SERPL BCP-MCNC: 4.5 G/DL (ref 3.5–5)
ALP SERPL-CCNC: 43 U/L (ref 34–104)
ALT SERPL W P-5'-P-CCNC: 26 U/L (ref 7–52)
ANION GAP SERPL CALCULATED.3IONS-SCNC: 9 MMOL/L (ref 4–13)
AST SERPL W P-5'-P-CCNC: 17 U/L (ref 13–39)
BASOPHILS # BLD AUTO: 0.15 THOUSANDS/ÂΜL (ref 0–0.1)
BASOPHILS NFR BLD AUTO: 1 % (ref 0–1)
BILIRUB SERPL-MCNC: 0.9 MG/DL (ref 0.2–1)
BUN SERPL-MCNC: 18 MG/DL (ref 5–25)
CALCIUM SERPL-MCNC: 9.3 MG/DL (ref 8.4–10.2)
CHLORIDE SERPL-SCNC: 107 MMOL/L (ref 96–108)
CO2 SERPL-SCNC: 24 MMOL/L (ref 21–32)
CREAT SERPL-MCNC: 0.88 MG/DL (ref 0.6–1.3)
EOSINOPHIL # BLD AUTO: 0.01 THOUSAND/ÂΜL (ref 0–0.61)
EOSINOPHIL NFR BLD AUTO: 0 % (ref 0–6)
ERYTHROCYTE [DISTWIDTH] IN BLOOD BY AUTOMATED COUNT: 13.2 % (ref 11.6–15.1)
EST. AVERAGE GLUCOSE BLD GHB EST-MCNC: 100 MG/DL
GFR SERPL CREATININE-BSD FRML MDRD: 113 ML/MIN/1.73SQ M
GLUCOSE SERPL-MCNC: 108 MG/DL (ref 65–140)
HBA1C MFR BLD: 5.1 %
HCT VFR BLD AUTO: 42.3 % (ref 36.5–49.3)
HGB BLD-MCNC: 14 G/DL (ref 12–17)
IMM GRANULOCYTES # BLD AUTO: 0.09 THOUSAND/UL (ref 0–0.2)
IMM GRANULOCYTES NFR BLD AUTO: 1 % (ref 0–2)
LYMPHOCYTES # BLD AUTO: 2.63 THOUSANDS/ÂΜL (ref 0.6–4.47)
LYMPHOCYTES NFR BLD AUTO: 16 % (ref 14–44)
MAGNESIUM SERPL-MCNC: 2.3 MG/DL (ref 1.9–2.7)
MCH RBC QN AUTO: 28.3 PG (ref 26.8–34.3)
MCHC RBC AUTO-ENTMCNC: 33.1 G/DL (ref 31.4–37.4)
MCV RBC AUTO: 86 FL (ref 82–98)
MONOCYTES # BLD AUTO: 1.56 THOUSAND/ÂΜL (ref 0.17–1.22)
MONOCYTES NFR BLD AUTO: 9 % (ref 4–12)
NEUTROPHILS # BLD AUTO: 12.1 THOUSANDS/ÂΜL (ref 1.85–7.62)
NEUTS SEG NFR BLD AUTO: 73 % (ref 43–75)
NRBC BLD AUTO-RTO: 0 /100 WBCS
PLATELET # BLD AUTO: 189 THOUSANDS/UL (ref 149–390)
PMV BLD AUTO: 12.3 FL (ref 8.9–12.7)
POTASSIUM SERPL-SCNC: 3.7 MMOL/L (ref 3.5–5.3)
PROT SERPL-MCNC: 7.1 G/DL (ref 6.4–8.4)
RBC # BLD AUTO: 4.95 MILLION/UL (ref 3.88–5.62)
SODIUM SERPL-SCNC: 140 MMOL/L (ref 135–147)
WBC # BLD AUTO: 16.54 THOUSAND/UL (ref 4.31–10.16)

## 2024-03-27 PROCEDURE — 0DB98ZX EXCISION OF DUODENUM, VIA NATURAL OR ARTIFICIAL OPENING ENDOSCOPIC, DIAGNOSTIC: ICD-10-PCS | Performed by: INTERNAL MEDICINE

## 2024-03-27 PROCEDURE — 80053 COMPREHEN METABOLIC PANEL: CPT

## 2024-03-27 PROCEDURE — 0DB48ZX EXCISION OF ESOPHAGOGASTRIC JUNCTION, VIA NATURAL OR ARTIFICIAL OPENING ENDOSCOPIC, DIAGNOSTIC: ICD-10-PCS | Performed by: INTERNAL MEDICINE

## 2024-03-27 PROCEDURE — 99232 SBSQ HOSP IP/OBS MODERATE 35: CPT | Performed by: INTERNAL MEDICINE

## 2024-03-27 PROCEDURE — 83735 ASSAY OF MAGNESIUM: CPT

## 2024-03-27 PROCEDURE — 0DB78ZX EXCISION OF STOMACH, PYLORUS, VIA NATURAL OR ARTIFICIAL OPENING ENDOSCOPIC, DIAGNOSTIC: ICD-10-PCS | Performed by: INTERNAL MEDICINE

## 2024-03-27 PROCEDURE — 85025 COMPLETE CBC W/AUTO DIFF WBC: CPT

## 2024-03-27 PROCEDURE — 0DB68ZX EXCISION OF STOMACH, VIA NATURAL OR ARTIFICIAL OPENING ENDOSCOPIC, DIAGNOSTIC: ICD-10-PCS | Performed by: INTERNAL MEDICINE

## 2024-03-27 PROCEDURE — 83036 HEMOGLOBIN GLYCOSYLATED A1C: CPT

## 2024-03-27 PROCEDURE — 99239 HOSP IP/OBS DSCHRG MGMT >30: CPT | Performed by: INTERNAL MEDICINE

## 2024-03-27 PROCEDURE — C9113 INJ PANTOPRAZOLE SODIUM, VIA: HCPCS

## 2024-03-27 PROCEDURE — 88305 TISSUE EXAM BY PATHOLOGIST: CPT | Performed by: PATHOLOGY

## 2024-03-27 RX ORDER — SODIUM CHLORIDE, SODIUM LACTATE, POTASSIUM CHLORIDE, CALCIUM CHLORIDE 600; 310; 30; 20 MG/100ML; MG/100ML; MG/100ML; MG/100ML
INJECTION, SOLUTION INTRAVENOUS CONTINUOUS PRN
Status: DISCONTINUED | OUTPATIENT
Start: 2024-03-27 | End: 2024-03-27

## 2024-03-27 RX ORDER — LIDOCAINE HYDROCHLORIDE 20 MG/ML
INJECTION, SOLUTION EPIDURAL; INFILTRATION; INTRACAUDAL; PERINEURAL AS NEEDED
Status: DISCONTINUED | OUTPATIENT
Start: 2024-03-27 | End: 2024-03-27

## 2024-03-27 RX ORDER — POLYETHYLENE GLYCOL 3350 17 G/17G
17 POWDER, FOR SOLUTION ORAL DAILY
Status: DISCONTINUED | OUTPATIENT
Start: 2024-03-27 | End: 2024-03-27 | Stop reason: HOSPADM

## 2024-03-27 RX ORDER — PROPOFOL 10 MG/ML
INJECTION, EMULSION INTRAVENOUS AS NEEDED
Status: DISCONTINUED | OUTPATIENT
Start: 2024-03-27 | End: 2024-03-27

## 2024-03-27 RX ORDER — OXYCODONE HYDROCHLORIDE 5 MG/1
5 TABLET ORAL EVERY 4 HOURS PRN
Status: DISCONTINUED | OUTPATIENT
Start: 2024-03-27 | End: 2024-03-27 | Stop reason: HOSPADM

## 2024-03-27 RX ORDER — ONDANSETRON 4 MG/1
4 TABLET, FILM COATED ORAL EVERY 8 HOURS PRN
Qty: 20 TABLET | Refills: 0 | Status: SHIPPED | OUTPATIENT
Start: 2024-03-27

## 2024-03-27 RX ORDER — PANTOPRAZOLE SODIUM 40 MG/1
40 TABLET, DELAYED RELEASE ORAL DAILY
Qty: 30 TABLET | Refills: 0 | Status: SHIPPED | OUTPATIENT
Start: 2024-03-27

## 2024-03-27 RX ADMIN — PROPOFOL 100 MG: 10 INJECTION, EMULSION INTRAVENOUS at 14:02

## 2024-03-27 RX ADMIN — ONDANSETRON 4 MG: 2 INJECTION INTRAMUSCULAR; INTRAVENOUS at 02:56

## 2024-03-27 RX ADMIN — ONDANSETRON 4 MG: 2 INJECTION INTRAMUSCULAR; INTRAVENOUS at 09:11

## 2024-03-27 RX ADMIN — MORPHINE SULFATE 2 MG: 2 INJECTION, SOLUTION INTRAMUSCULAR; INTRAVENOUS at 03:02

## 2024-03-27 RX ADMIN — OXYCODONE HYDROCHLORIDE 5 MG: 5 TABLET ORAL at 00:47

## 2024-03-27 RX ADMIN — PROPOFOL 150 MG: 10 INJECTION, EMULSION INTRAVENOUS at 13:57

## 2024-03-27 RX ADMIN — SODIUM CHLORIDE, SODIUM LACTATE, POTASSIUM CHLORIDE, CALCIUM CHLORIDE: 600; 310; 30; 20 INJECTION, SOLUTION INTRAVENOUS at 13:52

## 2024-03-27 RX ADMIN — LIDOCAINE HYDROCHLORIDE 100 MG: 20 INJECTION, SOLUTION EPIDURAL; INFILTRATION; INTRACAUDAL; PERINEURAL at 13:57

## 2024-03-27 RX ADMIN — ONDANSETRON 4 MG: 2 INJECTION INTRAMUSCULAR; INTRAVENOUS at 06:19

## 2024-03-27 RX ADMIN — MORPHINE SULFATE 2 MG: 2 INJECTION, SOLUTION INTRAMUSCULAR; INTRAVENOUS at 09:11

## 2024-03-27 RX ADMIN — PANTOPRAZOLE SODIUM 40 MG: 40 INJECTION, POWDER, FOR SOLUTION INTRAVENOUS at 09:11

## 2024-03-27 RX ADMIN — PROPOFOL 100 MG: 10 INJECTION, EMULSION INTRAVENOUS at 14:00

## 2024-03-27 RX ADMIN — PROPOFOL 100 MG: 10 INJECTION, EMULSION INTRAVENOUS at 14:04

## 2024-03-27 NOTE — DISCHARGE SUMMARY
[facility]  Discharge- Matt Jules 1990, 33 y.o. male MRN: [id number]  Unit/Bed#: W -01 Encounter: [id number]  Primary Care Provider: No primary care provider on file.   Date and time admitted to hospital: 3/26/2024  7:03 AM    * Intractable nausea and vomiting  Assessment & Plan  Assessment:  Presents with 1d hx of severe epigastric burning, N, vomiting with blood, and loose bowel movements  Prior history consists of duodenal ulcer dx in 2021, followed up with GI as outpatient.  EGD was performed but unremarkable, protonix stopped at that time  Gastric emptying studying was ordered but never completed, patient lost to follow up over last 3 years.  Has had intermittent episodes of similar symptoms  Admission labs WNL apart from WBC elevated 16.41  11/2021 EGD showed normal findings  Allergy panel on 8/2/2021 showed allergies to egg white, gluten, cows milk, peanut, shrimp, sesame seed, walnut, wheat, almonds, hazelnut, and elevated IgE 3,941.  Referred to outpatient allergy although no chart record exists a follow-up.  Celiac disease workup at that time was negative.  Differential diagnosis includes but is not limited to gastric/duodenal ulcer, gastroenteritis, gastroparesis, or exacerbation of allergies  /27 EGD: Moderate erythematous petechial and mosaic mucosa with erosion in the fundus of stomach.  Biopsy was performed to rule out H. pylori.  Duodenum appeared normal    Plan:  IV reglan scheduled, zofran prn  GI consulted and patient underwent EGD on 3/27.  Continue PPI, symptomatic emetics, and anti ulcerogenic diet  Protonix 40 mg bid     Tobacco use  Assessment & Plan  Counseled on lifestyle recommendations    Leukocytosis  Assessment & Plan  Lab Results   Component Value Date    WBC 16.54 (H) 03/27/2024   Likely reactive      Medical Problems       Resolved Problems  Date Reviewed: 3/26/2024   None       Discharging Resident: Kyle Brunner, MD  Discharging Attending:  Lynn London MD  PCP: No primary care provider on file.  Admission Date:   Admission Orders (From admission, onward)       Ordered        03/26/24 1102  INPATIENT ADMISSION  Once                          Discharge Date: 03/27/24    Consultations During Hospital Stay:  Gastroenterology    Procedures Performed:   3/27 EGD    Significant Findings / Test Results:   3/27 EGD: Normal-appearing esophagus.  Irregular Z-line.  Moderate erythematous, petechial and mosaic mucosa with erosion in the fundus of the stomach.  Duodenum.  Normal    Incidental Findings:   None    Test Results Pending at Discharge (will require follow up):   EGD biopsies     Outpatient Tests Requested:  CBC in 1 week    Complications: None    Reason for Admission: Intractable nausea and vomiting    Hospital Course:   Matt Jules is a 33 y.o. male patient history of tobacco abuse and recurrent episodes of vomiting and nausea who originally presented to the hospital on 3/26/2024 due to nephrectomy worsening nausea with bloody emesis.  In the ED, patient received droperidol, famotidine, Carafate, and Maalox.  He remains symptomatic with continued nausea however he was able to tolerate some p.o. intake.  Gastroenterology was consulted given the recurrent nature of his symptoms and given his history of duodenal ulcers.  He underwent EGD on 3/27 with findings stated above.  He will have to follow-up on the biopsies for H. pylori with his primary care provider.    The patient, initially admitted to the hospital as inpatient, was discharged earlier than expected given the following: significant improvement in nausea and vomiting.    Please see above list of diagnoses and related plan for additional information.     Condition at Discharge: good    Discharge Day Visit / Exam:   * Please refer to separate progress note for these details *    Discussion with Family: Updated  (mother) at bedside.    Discharge instructions/Information to patient  and family:   See after visit summary for information provided to patient and family.      Provisions for Follow-Up Care:  See after visit summary for information related to follow-up care and any pertinent home health orders.      Mobility at time of Discharge:   Basic Mobility Inpatient Raw Score: 24  JH-HLM Goal: 8: Walk 250 feet or more  JH-HLM Achieved: 8: Walk 250 feet ot more  HLM Goal achieved. Continue to encourage appropriate mobility.     Disposition:   Home    Planned Readmission: no    Discharge Medications:  See after visit summary for reconciled discharge medications provided to patient and/or family.      **Please Note: This note may have been constructed using a voice recognition system**

## 2024-03-27 NOTE — PLAN OF CARE
Problem: Nutrition/Hydration-ADULT  Goal: Nutrient/Hydration intake appropriate for improving, restoring or maintaining nutritional needs  Description: Monitor and assess patient's nutrition/hydration status for malnutrition. Collaborate with interdisciplinary team and initiate plan and interventions as ordered.  Monitor patient's weight and dietary intake as ordered or per policy. Utilize nutrition screening tool and intervene as necessary. Determine patient's food preferences and provide high-protein, high-caloric foods as appropriate.     INTERVENTIONS:  - Monitor oral intake, urinary output, labs, and treatment plans  - Assess nutrition and hydration status and recommend course of action  - Evaluate amount of meals eaten  - Assist patient with eating if necessary   - Allow adequate time for meals  - Recommend/ encourage appropriate diets, oral nutritional supplements, and vitamin/mineral supplements  - Order, calculate, and assess calorie counts as needed  - Recommend, monitor, and adjust tube feedings and TPN/PPN based on assessed needs  - Assess need for intravenous fluids  - Provide specific nutrition/hydration education as appropriate  - Include patient/family/caregiver in decisions related to nutrition  Outcome: Adequate for Discharge     Problem: PAIN - ADULT  Goal: Verbalizes/displays adequate comfort level or baseline comfort level  Description: Interventions:  - Encourage patient to monitor pain and request assistance  - Assess pain using appropriate pain scale  - Administer analgesics based on type and severity of pain and evaluate response  - Implement non-pharmacological measures as appropriate and evaluate response  - Consider cultural and social influences on pain and pain management  - Notify physician/advanced practitioner if interventions unsuccessful or patient reports new pain  Outcome: Adequate for Discharge     Problem: INFECTION - ADULT  Goal: Absence or prevention of progression during  hospitalization  Description: INTERVENTIONS:  - Assess and monitor for signs and symptoms of infection  - Monitor lab/diagnostic results  - Monitor all insertion sites, i.e. indwelling lines, tubes, and drains  - Monitor endotracheal if appropriate and nasal secretions for changes in amount and color  - [place] appropriate cooling/warming therapies per order  - Administer medications as ordered  - Instruct and encourage patient and family to use good hand hygiene technique  - Identify and instruct in appropriate isolation precautions for identified infection/condition  Outcome: Adequate for Discharge  Goal: Absence of fever/infection during neutropenic period  Description: INTERVENTIONS:  - Monitor WBC    Outcome: Adequate for Discharge     Problem: SAFETY ADULT  Goal: Patient will remain free of falls  Description: INTERVENTIONS:  - Educate patient/family on patient safety including physical limitations  - Instruct patient to call for assistance with activity   - Consult OT/PT to assist with strengthening/mobility   - Keep Call bell within reach  - Keep bed low and locked with side rails adjusted as appropriate  - Keep care items and personal belongings within reach  - Initiate and maintain comfort rounds  - Make Fall Risk Sign visible to staff  - Apply yellow socks and bracelet for high fall risk patients  - Consider moving patient to room near nurses station  Outcome: Adequate for Discharge  Goal: Maintain or return to baseline ADL function  Description: INTERVENTIONS:  -  Assess patient's ability to carry out ADLs; assess patient's baseline for ADL function and identify physical deficits which impact ability to perform ADLs (bathing, care of mouth/teeth, toileting, grooming, dressing, etc.)  - Assess/evaluate cause of self-care deficits   - Assess range of motion  - Assess patient's mobility; develop plan if impaired  - Assess patient's need for assistive devices and provide as appropriate  - Encourage maximum  independence but intervene and supervise when necessary  - Involve family in performance of ADLs  - Assess for home care needs following discharge   - Consider OT consult to assist with ADL evaluation and planning for discharge  - Provide patient education as appropriate  Outcome: Adequate for Discharge  Goal: Maintains/Returns to pre admission functional level  Description: INTERVENTIONS:  - Perform AM-PAC 6 Click Basic Mobility/ Daily Activity assessment daily.  - Set and communicate daily mobility goal to care team and patient/family/caregiver.   - Collaborate with rehabilitation services on mobility goals if consulted  - Out of bed for toileting  - Record patient progress and toleration of activity level   Outcome: Adequate for Discharge     Problem: Knowledge Deficit  Goal: Patient/family/caregiver demonstrates understanding of disease process, treatment plan, medications, and discharge instructions  Description: Complete learning assessment and assess knowledge base.  Interventions:  - Provide teaching at level of understanding  - Provide teaching via preferred learning methods  Outcome: Adequate for Discharge     Problem: DISCHARGE PLANNING  Goal: Discharge to home or other facility with appropriate resources  Description: INTERVENTIONS:  - Identify barriers to discharge w/patient and caregiver  - Arrange for needed discharge resources and transportation as appropriate  - Identify discharge learning needs (meds, wound care, etc.)  - Arrange for interpretive services to assist at discharge as needed  - Refer to Case Management Department for coordinating discharge planning if the patient needs post-hospital services based on physician/advanced practitioner order or complex needs related to functional status, cognitive ability, or social support system  Outcome: Adequate for Discharge

## 2024-03-27 NOTE — PROGRESS NOTES
[facility]  Progress Note  Name: Matt Jules I  MRN: [id number]  Unit/Bed#: W -01 I Date of Admission: 3/26/2024   Date of Service: 3/27/2024 I Hospital Day: 1    Assessment/Plan   * Intractable nausea and vomiting  Assessment & Plan  Assessment:  Presents with 1d hx of severe epigastric burning, N, vomiting with blood, and loose bowel movements  Prior history consists of duodenal ulcer dx in 2021, followed up with GI as outpatient.  EGD was performed but unremarkable, protonix stopped at that time  Gastric emptying studying was ordered but never completed, patient lost to follow up over last 3 years.  Has had intermittent episodes of similar symptoms  Admission labs WNL apart from WBC elevated 16.41  11/2021 EGD showed normal findings  Allergy panel on 8/2/2021 showed allergies to egg white, gluten, cows milk, peanut, shrimp, sesame seed, walnut, wheat, almonds, hazelnut, and elevated IgE 3,941.  Referred to outpatient allergy although no chart record exists a follow-up.  Celiac disease workup at that time was negative.  Differential diagnosis includes but is not limited to gastric/duodenal ulcer, gastroenteritis, gastroparesis, or exacerbation of allergies    Plan:  IV reglan scheduled, zofran prn  GI consulted. NPO at midnight for potential intervention  Protonix 40 mg bid     Tobacco use  Assessment & Plan  Counseled on lifestyle recommendations    Leukocytosis  Assessment & Plan  Lab Results   Component Value Date    WBC 16.54 (H) 03/27/2024   Likely reactive           VTE Pharmacologic Prophylaxis: VTE Score: 2 Moderate Risk (Score 3-4) - Pharmacological DVT Prophylaxis Ordered: enoxaparin (Lovenox).    Mobility:   Basic Mobility Inpatient Raw Score: 24  JH-HLM Goal: 8: Walk 250 feet or more  JH-HLM Achieved: 8: Walk 250 feet ot more  JH-HLM Goal achieved. Continue to encourage appropriate mobility.    Patient Centered Rounds: I performed bedside rounds with nursing staff  today.  Discussions with Specialists or Other Care Team Provider: Urology    Education and Discussions with Family / Patient:  We will update family.     Current Length of Stay: 1 day(s)  Current Patient Status: Inpatient   Discharge Plan: Anticipate discharge in 24-48 hrs to discharge location to be determined pending rehab evaluations.    Code Status: Level 1 - Full Code    Subjective:   Patient was seen sitting up at bedside.  He reported his last episode of vomiting was 12 hours ago with small amount of blood.  He states that he has been having some bowel movements but does not state whether they were looser had blood in them.  He states that he has had these episodes of cyclic vomiting and nausea in the past.  Again he reiterated that he does not smoke marijuana, however he does state that he has been smoking nicotine more frequently lately.  He is aware that this only worsens his nausea and vomiting.    Patient has been n.p.o. since midnight for potential intervention by gastroenterology today.    Objective:     Vitals:   Temp (24hrs), Av.8 °F (37.1 °C), Min:97.5 °F (36.4 °C), Max:100.4 °F (38 °C)    Temp:  [97.5 °F (36.4 °C)-100.4 °F (38 °C)] 98.4 °F (36.9 °C)  HR:  [49-82] 82  Resp:  [17-18] 17  BP: (110-124)/(52-71) 111/56  SpO2:  [95 %-99 %] 98 %  Body mass index is 26.5 kg/m².     Input and Output Summary (last 24 hours):     Intake/Output Summary (Last 24 hours) at 3/27/2024 0845  Last data filed at 3/26/2024 1700  Gross per 24 hour   Intake 1100 ml   Output --   Net 1100 ml       Physical Exam:   Physical Exam  Vitals and nursing note reviewed.   Constitutional:       General: He is not in acute distress.     Appearance: He is well-developed. He is not ill-appearing or diaphoretic.   HENT:      Head: Normocephalic and atraumatic.      Mouth/Throat:      Mouth: Mucous membranes are moist.      Pharynx: Oropharynx is clear. No oropharyngeal exudate.   Eyes:      General: No scleral icterus.      Extraocular Movements: Extraocular movements intact.      Conjunctiva/sclera: Conjunctivae normal.      Pupils: Pupils are equal, round, and reactive to light.   Cardiovascular:      Rate and Rhythm: Normal rate and regular rhythm.      Pulses: Normal pulses.      Heart sounds: Normal heart sounds. No murmur heard.  Pulmonary:      Effort: Pulmonary effort is normal. No respiratory distress.      Breath sounds: Normal breath sounds. No wheezing or rales.   Abdominal:      General: Abdomen is flat. Bowel sounds are normal. There is no distension.      Palpations: Abdomen is soft. There is no mass.      Tenderness: There is no abdominal tenderness. There is no guarding.   Musculoskeletal:         General: No swelling.      Cervical back: Neck supple.      Right lower leg: No edema.      Left lower leg: No edema.   Skin:     General: Skin is warm and dry.      Capillary Refill: Capillary refill takes less than 2 seconds.   Neurological:      Mental Status: He is alert and oriented to person, place, and time.   Psychiatric:         Mood and Affect: Mood normal.         Behavior: Behavior normal.          Additional Data:     Labs:  Results from last 7 days   Lab Units 03/27/24  0532   WBC Thousand/uL 16.54*   HEMOGLOBIN g/dL 14.0   HEMATOCRIT % 42.3   PLATELETS Thousands/uL 189   NEUTROS PCT % 73   LYMPHS PCT % 16   MONOS PCT % 9   EOS PCT % 0     Results from last 7 days   Lab Units 03/27/24  0532   SODIUM mmol/L 140   POTASSIUM mmol/L 3.7   CHLORIDE mmol/L 107   CO2 mmol/L 24   BUN mg/dL 18   CREATININE mg/dL 0.88   ANION GAP mmol/L 9   CALCIUM mg/dL 9.3   ALBUMIN g/dL 4.5   TOTAL BILIRUBIN mg/dL 0.90   ALK PHOS U/L 43   ALT U/L 26   AST U/L 17   GLUCOSE RANDOM mg/dL 108                       Lines/Drains:  Invasive Devices       Peripheral Intravenous Line  Duration             Peripheral IV 03/26/24 Right Antecubital 1 day    Peripheral IV 03/26/24 Right;Dorsal (posterior) Forearm <1 day                           Imaging: Reviewed radiology reports from this admission including: chest xray    Recent Cultures (last 7 days):         Last 24 Hours Medication List:   Current Facility-Administered Medications   Medication Dose Route Frequency Provider Last Rate    acetaminophen  650 mg Oral Q6H PRN Neeraj Sorenson MD      aluminum-magnesium hydroxide-simethicone  30 mL Oral Q4H PRN Brien Rae MD      enoxaparin  40 mg Subcutaneous Daily Brien Rae MD      morphine injection  2 mg Intravenous Q6H PRN Neeraj Sorenson MD      multi-electrolyte  100 mL/hr Intravenous Continuous Brien Rae  mL/hr (03/27/24 0623)    ondansetron  4 mg Intravenous Q4H Brien Rae MD      oxyCODONE  5 mg Oral Q4H PRN Wilton Whiteside, DO      pantoprazole  40 mg Intravenous Q12H GABRIELA Rae MD      polyethylene glycol  17 g Oral Daily Wilton Whiteside, DO      sucralfate  1 g Oral Q6H GABRIELA Rae MD      trimethobenzamide  200 mg Intramuscular Q6H PRN Brien Rae MD          Today, Patient Was Seen By: Kyle Brunner, MD    **Please Note: This note may have been constructed using a voice recognition system.**

## 2024-03-27 NOTE — UTILIZATION REVIEW
Initial Clinical Review    Admission: Date/Time/Statement:   Admission Orders (From admission, onward)       Ordered        03/26/24 1102  INPATIENT ADMISSION  Once                          Orders Placed This Encounter   Procedures    INPATIENT ADMISSION     Standing Status:   Standing     Number of Occurrences:   1     Order Specific Question:   Level of Care     Answer:   Med Surg [16]     Order Specific Question:   Estimated length of stay     Answer:   Not Applicable     ED Arrival Information       Expected   -    Arrival   3/26/2024 07:00    Acuity   Urgent              Means of arrival   Walk-In    Escorted by   Family Member    Service   Hospitalist    Admission type   Emergency              Arrival complaint   Throwing up blood             Chief Complaint   Patient presents with    Vomiting     Pt presents to the ED with multiple episodes of vomiting. Hx of gi ulcer. Pt reports last episode  this morning, reports noticing small flecks of blood. Diffuse abd pain.        Initial Presentation: 33 y.o. male with a PMH of duodenal ulcer and tobacco use who presents with one day of severe abdominal pain, nausea and vomiting. Patient says he was in his normal state of health prior to yesterday evening. Patient is a aware of his food allergies and tries to be conscious of what he is eating. He has not made any changes to his diet lately. Last night patient had frozen meatballs and spaghetti with sauce. He says he ate too much and felt bloated after the meal. Later that night he woke up with severe epigastric pain characterized as burning that radiated down his midline just above the umbilicus. He denies any flank/back pain or pain with defecation. He also has had intractable nausea and vomiting that started out as his dinner and turned bilious. He mentions there may have been blood mixed in during the last episode he had prior to coming to the hospital. He has also had 3 episodes of loose nonbloody bowel movments  since his symptoms began. Patient says he is no longer able to tolerate any oral intake. Plan: Inpatient admission for evaluation and treatment of intractable nausea/vomiting: scheduled IV Zofran, Tigan prn, IV fluids, clear liquid diet-advance as tolerated, Protonix bid, GI consult, trend CBC and BMP.       ED Triage Vitals   Temperature Pulse Respirations Blood Pressure SpO2   03/26/24 0724 03/26/24 0717 03/26/24 0717 03/26/24 0717 03/26/24 0717   98.6 °F (37 °C) 68 16 138/72 97 %      Temp Source Heart Rate Source Patient Position - Orthostatic VS BP Location FiO2 (%)   03/26/24 0724 03/26/24 0945 03/26/24 0717 03/26/24 0717 --   Oral Monitor Sitting Right arm       Pain Score       03/26/24 0717       6          Wt Readings from Last 1 Encounters:   03/27/24 86.2 kg (190 lb)     Additional Vital Signs:     Date/Time Temp Pulse Resp BP MAP (mmHg) SpO2 O2 Device   03/27/24 1302 97 °F (36.1 °C) Abnormal  52 Abnormal  16 119/64 -- 98 % None (Room air)   03/27/24 0700 98.4 °F (36.9 °C) 82 17 111/56 -- -- --   03/26/24 2300 -- 54 Abnormal  -- -- -- 98 % --   03/26/24 22:32:25 98.5 °F (36.9 °C) 49 Abnormal  -- -- -- 96 % --   03/26/24 22:07:37 100.4 °F (38 °C)  63 -- 114/71 85 96 % --   03/26/24 2015 -- -- -- -- -- 98 % None (Room air)   03/26/24 15:45:11 99.4 °F (37.4 °C) 50 Abnormal  -- 122/70 87 96 % --   03/26/24 13:30:30 97.5 °F (36.4 °C) 57 -- 124/58 80 95 % --   03/26/24 0945 -- 54 Abnormal  18 110/52 -- 99 % None (Room air)   03/26/24 0725 -- -- -- -- -- -- None (Room air)   03/26/24 0724 98.6 °F (37 °C) -- -- -- -- -- --     Pertinent Labs/Diagnostic Test Results:   XR chest 2 views   ED Interpretation by Katia Barrios MD (03/26 9768)   No acute cardiopulmonary process noted.  No free air under the diaphragm.      Final Result by Fam Fregoso MD (03/26 8090)      No acute cardiopulmonary disease.      This report is in agreement with the preliminary interpretation.         Workstation performed:  [id number]               Results from last 7 days   Lab Units 03/27/24  0532 03/26/24  1413 03/26/24  0732   WBC Thousand/uL 16.54*  --  19.04*   HEMOGLOBIN g/dL 14.0  --  15.8   HEMATOCRIT % 42.3  --  47.2   PLATELETS Thousands/uL 189 190 235   NEUTROS ABS Thousands/µL 12.10*  --   --          Results from last 7 days   Lab Units 03/27/24  0532 03/26/24  0732   SODIUM mmol/L 140 140   POTASSIUM mmol/L 3.7 3.6   CHLORIDE mmol/L 107 103   CO2 mmol/L 24 24   ANION GAP mmol/L 9 13   BUN mg/dL 18 19   CREATININE mg/dL 0.88 0.88   EGFR ml/min/1.73sq m 113 113   CALCIUM mg/dL 9.3 10.1   MAGNESIUM mg/dL 2.3  --      Results from last 7 days   Lab Units 03/27/24  0532 03/26/24  0732   AST U/L 17 17   ALT U/L 26 30   ALK PHOS U/L 43 51   TOTAL PROTEIN g/dL 7.1 8.2   ALBUMIN g/dL 4.5 5.1*   TOTAL BILIRUBIN mg/dL 0.90 0.77         Results from last 7 days   Lab Units 03/27/24  0532 03/26/24  0732   GLUCOSE RANDOM mg/dL 108 125           Results from last 7 days   Lab Units 03/26/24  0732   LIPASE u/L <6*         ED Treatment:   Medication Administration from 03/26/2024 0700 to 03/26/2024 1320         Date/Time Order Dose Route Action     03/26/2024 0733 EDT sodium chloride 0.9 % bolus 1,000 mL 1,000 mL Intravenous New Bag     03/26/2024 0733 EDT droperidol (INAPSINE) injection 1.25 mg 1.25 mg Intravenous Given     03/26/2024 0756 EDT sucralfate (CARAFATE) tablet 1 g 1 g Oral Given     03/26/2024 0758 EDT aluminum-magnesium hydroxide-simethicone (MAALOX) oral suspension 30 mL 30 mL Oral Given     03/26/2024 0906 EDT Famotidine (PF) (PEPCID) injection 20 mg 20 mg Intravenous Given     03/26/2024 0912 EDT ondansetron (ZOFRAN) injection 4 mg 4 mg Intravenous Given     03/26/2024 1040 EDT ondansetron (ZOFRAN) injection 4 mg 4 mg Intravenous Given     03/26/2024 1041 EDT ketorolac (TORADOL) injection 15 mg 15 mg Intravenous Given     03/26/2024 1039 EDT sodium chloride 0.9 % bolus 1,000 mL 1,000 mL Intravenous New Bag          Past  Medical History:   Diagnosis Date    Duodenal ulcer      Present on Admission:   Intractable nausea and vomiting   Leukocytosis   Tobacco use      Admitting Diagnosis: Vomiting [R11.10]  Epigastric pain [R10.13]  Nausea & vomiting [R11.2]  Age/Sex: 33 y.o. male  Admission Orders:  Scheduled Medications:  enoxaparin, 40 mg, Subcutaneous, Daily  ondansetron, 4 mg, Intravenous, Q4H  pantoprazole, 40 mg, Intravenous, Q12H GABRIELA  polyethylene glycol, 17 g, Oral, Daily  sucralfate, 1 g, Oral, Q6H GABRIELA      Continuous IV Infusions:  multi-electrolyte, 100 mL/hr, Intravenous, Continuous      PRN Meds:  acetaminophen, 650 mg, Oral, Q6H PRN  aluminum-magnesium hydroxide-simethicone, 30 mL, Oral, Q4H PRN  morphine injection, 2 mg, Intravenous, Q6H PRN  oxyCODONE, 5 mg, Oral, Q4H PRN  trimethobenzamide, 200 mg, Intramuscular, Q6H PRN        IP CONSULT TO GASTROENTEROLOGY    Network Utilization Review Department  ATTENTION: Please call with any questions or concerns to [phone] and carefully listen to the prompts so that you are directed to the right person. All voicemails are confidential.   For Discharge needs, contact Care Management DC Support Team at [phone] opt. 2  Send all requests for admission clinical reviews, approved or denied determinations and any other requests to dedicated fax number below belonging to the campus where the patient is receiving treatment. List of dedicated fax numbers for the Facilities:  FACILITY NAME UR FAX NUMBER   ADMISSION DENIALS (Administrative/Medical Necessity) [phone]   DISCHARGE SUPPORT TEAM (NETWORK) [phone]   PARENT CHILD HEALTH (Maternity/NICU/Pediatrics) [phone]   [facility] [phone]   [facility] [phone]   [facility] [phone]   [facility] [phone]   [facility] [phone]   [facility]  [phone]   [facility] [phone]   [facility] [phone]   [facility] [phone]   Pending sale to [facility] [phone]   [facility] [phone]   [facility] [phone]

## 2024-03-27 NOTE — ASSESSMENT & PLAN NOTE
Assessment:  Presents with 1d hx of severe epigastric burning, N, vomiting with blood, and loose bowel movements  Prior history consists of duodenal ulcer dx in 2021, followed up with GI as outpatient.  EGD was performed but unremarkable, protonix stopped at that time  Gastric emptying studying was ordered but never completed, patient lost to follow up over last 3 years.  Has had intermittent episodes of similar symptoms  Admission labs WNL  11/2021 EGD showed normal findings  Differential diagnosis includes but is not limited to gastric/duodenal ulcer, gastroenteritis, gastroparesis    Plan:  IV reglan scheduled, zofran prn  GI consulted. NPO at midnight for potential intervention  Protonix 40 mg bid

## 2024-03-27 NOTE — DISCHARGE INSTR - AVS FIRST PAGE
Dear Matt Jules,     It was our pleasure to care for you here at [facility].  It is our hope that we were always able to exceed your expectations for your care during your stay.  You were hospitalized due to intractable nausea and vomiting and cared for on the West fourth floor by Kyle Brunner, MD under the service of Lynn London MD with the [facility] who covers for your primary care physician (PCP), No primary care provider on file.. If you have any questions or concerns related to this hospitalization, you may contact us at [phone]. A nurse will call you within a few days to answer any additional questions that may arise after your discharge. We recommend that you follow up with your PCP for medication refills. Please note the following instructions / recommendations:       STOP taking -   No medication adjustments    START taking -  Protonix 40 mg daily    Testing Required after Discharge -   CBC in 1 week  Please follow up with your primary care provider to order these tests    Important follow up information -   Please schedule an appointment with your primary care provider as soon as possible to discuss the results of your endoscopy  Please follow-up outpatient with a gastroenterologist for further medication adjustments and recommendations    Other Instructions -   Please consider talking to your primary care provider about smoking cessation materials or medications    Please review your entire after visit summary including medication list, appointments, activity, diet, pertinent wound care, and any additional recommendations from your care team.    We wish you well.    Sincerely,     Kyle Brunner, MD

## 2024-03-27 NOTE — ANESTHESIA PREPROCEDURE EVALUATION
Procedure:  EGD    Relevant Problems   GI/HEPATIC   (+) Duodenal ulcer        Physical Exam    Airway    Mallampati score: I  TM Distance: >3 FB  Neck ROM: full     Dental       Cardiovascular  Cardiovascular exam normal    Pulmonary  Pulmonary exam normal     Other Findings        Anesthesia Plan  ASA Score- 1     Anesthesia Type- IV sedation with anesthesia with ASA Monitors.         Additional Monitors:     Airway Plan:            Plan Factors-Exercise tolerance (METS): >4 METS.    Chart reviewed. EKG reviewed. Imaging results reviewed. Existing labs reviewed. Patient summary reviewed.                  Induction- intravenous.    Postoperative Plan- Plan for postoperative opioid use. Planned trial extubation    Informed Consent- Anesthetic plan and risks discussed with patient.  I personally reviewed this patient with the CRNA. Discussed and agreed on the Anesthesia Plan with the CRNA..

## 2024-03-27 NOTE — ASSESSMENT & PLAN NOTE
Assessment:  Presents with 1d hx of severe epigastric burning, N, vomiting with blood, and loose bowel movements  Prior history consists of duodenal ulcer dx in 2021, followed up with GI as outpatient.  EGD was performed but unremarkable, protonix stopped at that time  Gastric emptying studying was ordered but never completed, patient lost to follow up over last 3 years.  Has had intermittent episodes of similar symptoms  Admission labs WNL apart from WBC elevated 16.41  11/2021 EGD showed normal findings  Allergy panel on 8/2/2021 showed allergies to egg white, gluten, cows milk, peanut, shrimp, sesame seed, walnut, wheat, almonds, hazelnut, and elevated IgE 3,941.  Referred to outpatient allergy although no chart record exists a follow-up.  Celiac disease workup at that time was negative.  Differential diagnosis includes but is not limited to gastric/duodenal ulcer, gastroenteritis, gastroparesis, or exacerbation of allergies  /27 EGD: Moderate erythematous petechial and mosaic mucosa with erosion in the fundus of stomach.  Biopsy was performed to rule out H. pylori.  Duodenum appeared normal    Plan:  IV reglan scheduled, zofran prn  GI consulted and patient underwent EGD on 3/27.  Continue PPI, symptomatic emetics, and anti ulcerogenic diet  Protonix 40 mg bid

## 2024-03-27 NOTE — CONSULTS
Consultation -  [facility]  Matt Jules 33 y.o. male MRN: [id number]  Unit/Bed#: W -01 Encounter: [id number]    ASSESSMENT/PLAN:     #1.  Acute onset of epigastric pain, nausea vomiting and transient diarrhea, with recent hematemesis noted on last episode of vomiting yesterday.  Suspect acute viral gastroenteritis with hematemesis occurring from Amanda-Huynh tear.  No evidence of active GI bleeding currently, patient does have history of duodenal ulcer disease and should exclude more significant sources of hematemesis in any case    -Will plan for EGD    -Procedure was explained in detail to the patient at this time including associated risks and benefits, risks including but not limited to infection, perforation and bleeding    -Continue IV PPI twice daily for now    -Monitor hemoglobin closely, transfuse if needed, currently does not appear to indicated    -Will decide about PPI therapy post-hospital discharge pending results of EGD, but would likely benefit from a limited course    -Did advise patient about smoking cessation also as this is considered a risk factor for peptic ulcer disease with which he has known history        Inpatient consult to gastroenterology  Consult performed by: Tavo Myers PA-C  Consult ordered by: Brien Rae MD          Reason for Consult / Principal Problem: Intractable nausea and vomiting    HPI: Matt Jules is a 33 y.o. year old male with past history of duodenal ulcer, last EGD in 2021 showing no residual ulcer, who presented to the emergency room yesterday morning with complaints of severe abdominal pain, nausea and vomiting which have been going on since Sunday.  He said his last meal before onset of symptoms was spaghetti and frozen meatballs, woke up during the night with severe epigastric pain radiating down the midline, followed by bilious vomiting.  Patient said he had a few episodes of vomiting and thought there may have been some  blood mixed in the last episode of vomitus before he came here.  Also endorsed loose bowel movements which were nonbloody.    Lipase and liver enzymes appear within normal limits, his white count was elevated at 19, this morning 16.5.  Currently ordered for n.p.o. status and IV Protonix.    The patient tells me that his symptomatology generally started Sunday night, as of now his last episode of vomiting was about 12 hours ago, and he confirms at this time that his last episode of vomiting did include dark red blood mixed with the vomitus.  He reports that prior to Sunday he had been generally feeling well, although he reports that about once a year over the last 4 years he will have issues like this, interestingly typically occurring in the month of March.  He denies any NSAID use, he does smoke cigarettes.  He says he has been off PPI therapy for a while at this point.  He says he has had 1 bowel movement since coming to the hospital which was not bloody or dark-colored, denies any melena or hematochezia as of late otherwise.  He says he still has burning discomfort in his epigastric region and is currently receiving pain medications from nursing.  He says he has not had anything to eat or drink today, does not take any pharmacologic anticoagulation.      REVIEW OF SYSTEMS:    CONSTITUTIONAL: Denies any fever, chills, or rigors. Good appetite, and no recent weight loss.  HEENT: No earache or tinnitus. Denies hearing loss or visual disturbances.  CARDIOVASCULAR: No chest pain or palpitations.   RESPIRATORY: Denies any cough, hemoptysis, shortness of breath or dyspnea on exertion.  GASTROINTESTINAL: As noted in the History of Present Illness.   GENITOURINARY: No problems with urination. Denies any hematuria or dysuria.  NEUROLOGIC: No dizziness or vertigo, denies headaches.   MUSCULOSKELETAL: Denies any muscle or joint pain.   SKIN: Denies skin rashes or itching.   ENDOCRINE: Denies excessive thirst. Denies  intolerance to heat or cold.  PSYCHOSOCIAL: Denies depression or anxiety. Denies any recent memory loss.       Historical Information   Past Medical History:   Diagnosis Date    Duodenal ulcer      Past Surgical History:   Procedure Laterality Date    EGD      UPPER GASTROINTESTINAL ENDOSCOPY       Social History   Social History     Substance and Sexual Activity   Alcohol Use Not Currently     Social History     Substance and Sexual Activity   Drug Use Not Currently    Comment: Marijuana - stopped 1 year ago     Social History     Tobacco Use   Smoking Status Former    Types: Cigarettes   Smokeless Tobacco Never     Family History   Problem Relation Age of Onset    Lung cancer Maternal Grandmother        Meds/Allergies     Medications Prior to Admission   Medication    famotidine (PEPCID) 20 mg tablet    promethazine (PHENERGAN) 12.5 MG tablet     Current Facility-Administered Medications   Medication Dose Route Frequency    acetaminophen (TYLENOL) tablet 650 mg  650 mg Oral Q6H PRN    aluminum-magnesium hydroxide-simethicone (MAALOX) oral suspension 30 mL  30 mL Oral Q4H PRN    enoxaparin (LOVENOX) subcutaneous injection 40 mg  40 mg Subcutaneous Daily    morphine injection 2 mg  2 mg Intravenous Q6H PRN    multi-electrolyte (PLASMALYTE-A/ISOLYTE-S PH 7.4) IV solution  100 mL/hr Intravenous Continuous    ondansetron (ZOFRAN) injection 4 mg  4 mg Intravenous Q4H    oxyCODONE (ROXICODONE) IR tablet 5 mg  5 mg Oral Q4H PRN    pantoprazole (PROTONIX) injection 40 mg  40 mg Intravenous Q12H GABRIELA    polyethylene glycol (MIRALAX) packet 17 g  17 g Oral Daily    sucralfate (CARAFATE) tablet 1 g  1 g Oral Q6H GABRIELA    trimethobenzamide (TIGAN) IM injection 200 mg  200 mg Intramuscular Q6H PRN       No Known Allergies        Objective     Blood pressure 111/56, pulse 82, temperature 98.4 °F (36.9 °C), temperature source Probe, resp. rate 17, weight 86.2 kg (190 lb), SpO2 98%.      Intake/Output Summary (Last 24 hours) at  3/27/2024 0838  Last data filed at 3/26/2024 1700  Gross per 24 hour   Intake 1100 ml   Output --   Net 1100 ml         PHYSICAL EXAM     General Appearance:   Alert, cooperative, no distress, appears stated age    HEENT:   Normocephalic, atraumatic, anicteric.     Neck:  Supple, symmetrical, trachea midline, no adenopathy;    thyroid: no enlargement/tenderness/nodules; no carotid  bruit or JVD    Lungs:   Clear to auscultation bilaterally; no rales, rhonchi or wheezing; respirations unlabored    Heart::   S1 and S2 normal; regular rate and rhythm; no murmur, rub, or gallop.   Abdomen:   Soft, non-tender, non-distended; normal bowel sounds; no masses, no organomegaly    Genitalia:   Deferred    Rectal:   Deferred    Extremities:  No cyanosis, clubbing or edema    Pulses:  2+ and symmetric all extremities    Skin:  Skin color, texture, turgor normal, no rashes or lesions    Lymph nodes:  No palpable cervical, axillary or inguinal lymphadenopathy        Lab Results:   Admission on 03/26/2024   Component Date Value    WBC 03/26/2024 19.04 (H)     RBC 03/26/2024 5.70 (H)     Hemoglobin 03/26/2024 15.8     Hematocrit 03/26/2024 47.2     MCV 03/26/2024 83     MCH 03/26/2024 27.7     MCHC 03/26/2024 33.5     RDW 03/26/2024 13.1     MPV 03/26/2024 11.9     Platelets 03/26/2024 235     Sodium 03/26/2024 140     Potassium 03/26/2024 3.6     Chloride 03/26/2024 103     CO2 03/26/2024 24     ANION GAP 03/26/2024 13     BUN 03/26/2024 19     Creatinine 03/26/2024 0.88     Glucose 03/26/2024 125     Calcium 03/26/2024 10.1     AST 03/26/2024 17     ALT 03/26/2024 30     Alkaline Phosphatase 03/26/2024 51     Total Protein 03/26/2024 8.2     Albumin 03/26/2024 5.1 (H)     Total Bilirubin 03/26/2024 0.77     eGFR 03/26/2024 113     Lipase 03/26/2024 <6 (L)     Ventricular Rate 03/26/2024 54     Atrial Rate 03/26/2024 54     TX Interval 03/26/2024 160     QRSD Interval 03/26/2024 106     QT Interval 03/26/2024 440     QTC Interval  03/26/2024 417     P Axis 03/26/2024 44     QRS Axis 03/26/2024 49     T Wave [place] 03/26/2024 46     Segmented % 03/26/2024 83 (H)     Lymphocytes % 03/26/2024 11 (L)     Monocytes % 03/26/2024 6     Eosinophils % 03/26/2024 0     Basophils % 03/26/2024 0     Absolute Neutrophils 03/26/2024 15.80 (H)     Absolute Lymphocytes 03/26/2024 2.09     Absolute Monocytes 03/26/2024 1.14     Absolute Eosinophils 03/26/2024 0.00     Absolute Basophils 03/26/2024 0.00     RBC Morphology 03/26/2024 Normal     Platelet Estimate 03/26/2024 Adequate     Platelets 03/26/2024 190     MPV 03/26/2024 11.5     WBC 03/27/2024 16.54 (H)     RBC 03/27/2024 4.95     Hemoglobin 03/27/2024 14.0     Hematocrit 03/27/2024 42.3     MCV 03/27/2024 86     MCH 03/27/2024 28.3     MCHC 03/27/2024 33.1     RDW 03/27/2024 13.2     MPV 03/27/2024 12.3     Platelets 03/27/2024 189     nRBC 03/27/2024 0     Neutrophils Relative 03/27/2024 73     Immature Grans % 03/27/2024 1     Lymphocytes Relative 03/27/2024 16     Monocytes Relative 03/27/2024 9     Eosinophils Relative 03/27/2024 0     Basophils Relative 03/27/2024 1     Neutrophils Absolute 03/27/2024 12.10 (H)     Absolute Immature Grans 03/27/2024 0.09     Absolute Lymphocytes 03/27/2024 2.63     Absolute Monocytes 03/27/2024 1.56 (H)     Eosinophils Absolute 03/27/2024 0.01     Basophils Absolute 03/27/2024 0.15 (H)     Sodium 03/27/2024 140     Potassium 03/27/2024 3.7     Chloride 03/27/2024 107     CO2 03/27/2024 24     ANION GAP 03/27/2024 9     BUN 03/27/2024 18     Creatinine 03/27/2024 0.88     Glucose 03/27/2024 108     Calcium 03/27/2024 9.3     AST 03/27/2024 17     ALT 03/27/2024 26     Alkaline Phosphatase 03/27/2024 43     Total Protein 03/27/2024 7.1     Albumin 03/27/2024 4.5     Total Bilirubin 03/27/2024 0.90     eGFR 03/27/2024 113     Magnesium 03/27/2024 2.3        Imaging Studies: I have personally reviewed pertinent reports.      XR CHEST PA & LATERAL     INDICATION:  epigastric pain.     COMPARISON: 11/1/2020     FINDINGS:     Clear lungs. No pneumothorax or pleural effusion.     Normal cardiomediastinal silhouette.     Bones are unremarkable for age.     Normal upper abdomen.     IMPRESSION:     No acute cardiopulmonary disease.     This report is in agreement with the preliminary interpretation    EGD, 11/2021  FINDINGS:  The esophagus appeared normal.  Stomach-small amount of soft food material in the fundus was washed off and suctioned.  Gastric mucosa was normal status post biopsies.  The duodenum appeared normal. Performed random biopsy.  SPECIMENS:  * No specimens in log *  IMPRESSION:  The esophagus appeared normal.  Stomach-small amount of soft food material in the fundus was washed off and suctioned.  Gastric mucosa was normal status post biopsies.  The duodenum appeared normal. Performed random biopsy.  RECOMMENDATION:  Await pathology results     Patient reports feeling lot better since he stopped milk and dairy products.     Advised him to go for gastric emptying study as ordered before            The patient was seen and examined by Dr. Ashton, all francois medical decisions were made with Dr. Ashton.  Thank you for allowing us to participate in the care of this pleasant patient.  We will follow up with you closely.

## 2024-03-31 LAB
ATRIAL RATE: 54 BPM
P AXIS: 44 DEGREES
PR INTERVAL: 160 MS
QRS AXIS: 49 DEGREES
QRSD INTERVAL: 106 MS
QT INTERVAL: 440 MS
QTC INTERVAL: 417 MS
T WAVE AXIS: 46 DEGREES
VENTRICULAR RATE: 54 BPM

## 2024-03-31 PROCEDURE — 93010 ELECTROCARDIOGRAM REPORT: CPT | Performed by: INTERNAL MEDICINE

## 2024-03-31 NOTE — ED ATTENDING ATTESTATION
3/26/2024  INguyễn DO, saw and evaluated the patient. I have discussed the patient with the resident/non-physician practitioner and agree with the resident's/non-physician practitioner's findings, Plan of Care, and MDM as documented in the resident's/non-physician practitioner's note, except where noted. All available labs and Radiology studies were reviewed.  I was present for key portions of any procedure(s) performed by the resident/non-physician practitioner and I was immediately available to provide assistance.       At this point I agree with the current assessment done in the Emergency Department.  I have conducted an independent evaluation of this patient a history and physical is as follows:    ED Course         Critical Care Time  Procedures

## 2024-04-02 NOTE — ANESTHESIA POSTPROCEDURE EVALUATION
Post-Op Assessment Note    CV Status:  Stable    Pain management: adequate    Multimodal analgesia used between 6 hours prior to anesthesia start to PACU discharge    Mental Status:  Sleepy   PONV Controlled:  Controlled   Airway Patency:  Patent  Two or more mitigation strategies used for obstructive sleep apnea   Post Op Vitals Reviewed: Yes    No anethesia notable event occurred.    Staff: Anesthesiologist               BP      Temp      Pulse     Resp      SpO2